# Patient Record
Sex: FEMALE | Race: WHITE | NOT HISPANIC OR LATINO | Employment: OTHER | URBAN - METROPOLITAN AREA
[De-identification: names, ages, dates, MRNs, and addresses within clinical notes are randomized per-mention and may not be internally consistent; named-entity substitution may affect disease eponyms.]

---

## 2018-09-10 ENCOUNTER — HOSPITAL ENCOUNTER (OUTPATIENT)
Dept: OTHER | Facility: HOSPITAL | Age: 73
Setting detail: SPECIMEN
Discharge: HOME | End: 2018-09-10
Attending: FAMILY MEDICINE | Admitting: FAMILY MEDICINE

## 2018-09-10 LAB
ALBUMIN SERPL-MCNC: 4.5 G/DL (ref 3.5–4.8)
ALBUMIN/GLOB SERPL: 1.5 {RATIO} (ref 1–1.7)
ALP SERPL-CCNC: 61 IU/L (ref 32–91)
ALT SERPL-CCNC: 18 IU/L (ref 14–54)
ANION GAP SERPL CALC-SCNC: 13.1 MMOL/L (ref 10–20)
AST SERPL-CCNC: 28 IU/L (ref 15–41)
BASOPHILS # BLD AUTO: 0.1 10*3/UL (ref 0–0.2)
BASOPHILS NFR BLD AUTO: 1 % (ref 0–2)
BILIRUB SERPL-MCNC: 0.8 MG/DL (ref 0.3–1.2)
BUN SERPL-MCNC: 14 MG/DL (ref 8–20)
BUN/CREAT SERPL: 20 (ref 5.4–26.2)
CALCIUM SERPL-MCNC: 9.8 MG/DL (ref 8.9–10.3)
CHLORIDE SERPL-SCNC: 96 MMOL/L (ref 101–111)
CHOLEST SERPL-MCNC: 193 MG/DL
CHOLEST/HDLC SERPL: 2.6 {RATIO}
CONV CO2: 28 MMOL/L (ref 22–32)
CONV LDL CHOLESTEROL DIRECT: 102 MG/DL (ref 0–100)
CONV TOTAL PROTEIN: 7.5 G/DL (ref 6.1–7.9)
CREAT UR-MCNC: 0.7 MG/DL (ref 0.4–1)
DIFFERENTIAL METHOD BLD: (no result)
EOSINOPHIL # BLD AUTO: 0.4 10*3/UL (ref 0–0.3)
EOSINOPHIL # BLD AUTO: 8 % (ref 0–3)
ERYTHROCYTE [DISTWIDTH] IN BLOOD BY AUTOMATED COUNT: 14 % (ref 11.5–14.5)
GLOBULIN UR ELPH-MCNC: 3 G/DL (ref 2.5–3.8)
GLUCOSE SERPL-MCNC: 111 MG/DL (ref 65–99)
HCT VFR BLD AUTO: 39.5 % (ref 35–49)
HDLC SERPL-MCNC: 75 MG/DL
HGB BLD-MCNC: 13.4 G/DL (ref 12–15)
LDLC/HDLC SERPL: 1.3 {RATIO}
LIPID INTERPRETATION: ABNORMAL
LYMPHOCYTES # BLD AUTO: 1.8 10*3/UL (ref 0.8–4.8)
LYMPHOCYTES NFR BLD AUTO: 35 % (ref 18–42)
MCH RBC QN AUTO: 29.3 PG (ref 26–32)
MCHC RBC AUTO-ENTMCNC: 33.9 G/DL (ref 32–36)
MCV RBC AUTO: 86.4 FL (ref 80–94)
MONOCYTES # BLD AUTO: 0.4 10*3/UL (ref 0.1–1.3)
MONOCYTES NFR BLD AUTO: 8 % (ref 2–11)
NEUTROPHILS # BLD AUTO: 2.5 10*3/UL (ref 2.3–8.6)
NEUTROPHILS NFR BLD AUTO: 48 % (ref 50–75)
NRBC BLD AUTO-RTO: 0 /100{WBCS}
NRBC/RBC NFR BLD MANUAL: 0 10*3/UL
PLATELET # BLD AUTO: 262 10*3/UL (ref 150–450)
PMV BLD AUTO: 8.7 FL (ref 7.4–10.4)
POTASSIUM SERPL-SCNC: 4.1 MMOL/L (ref 3.6–5.1)
RBC # BLD AUTO: 4.58 10*6/UL (ref 4–5.4)
SODIUM SERPL-SCNC: 133 MMOL/L (ref 136–144)
TRIGL SERPL-MCNC: 62 MG/DL
VLDLC SERPL CALC-MCNC: 16.1 MG/DL
WBC # BLD AUTO: 5.2 10*3/UL (ref 4.5–11.5)

## 2019-07-09 RX ORDER — LEVOTHYROXINE SODIUM 88 UG/1
1 TABLET ORAL EVERY 24 HOURS
COMMUNITY
Start: 2018-03-20 | End: 2019-07-09 | Stop reason: SDUPTHER

## 2019-07-09 RX ORDER — LEVOTHYROXINE SODIUM 88 UG/1
88 TABLET ORAL EVERY 24 HOURS
Qty: 90 TABLET | Refills: 1 | Status: SHIPPED | OUTPATIENT
Start: 2019-07-09 | End: 2019-07-10 | Stop reason: SDUPTHER

## 2019-07-10 RX ORDER — LEVOTHYROXINE SODIUM 88 UG/1
88 TABLET ORAL EVERY 24 HOURS
Qty: 90 TABLET | Refills: 1 | Status: SHIPPED | OUTPATIENT
Start: 2019-07-10 | End: 2020-01-21 | Stop reason: SDUPTHER

## 2019-07-22 ENCOUNTER — OFFICE VISIT (OUTPATIENT)
Dept: FAMILY MEDICINE CLINIC | Facility: CLINIC | Age: 74
End: 2019-07-22

## 2019-07-22 VITALS
DIASTOLIC BLOOD PRESSURE: 76 MMHG | HEIGHT: 66 IN | WEIGHT: 184.6 LBS | SYSTOLIC BLOOD PRESSURE: 138 MMHG | OXYGEN SATURATION: 97 % | HEART RATE: 65 BPM | BODY MASS INDEX: 29.67 KG/M2

## 2019-07-22 DIAGNOSIS — Z00.00 ENCOUNTER FOR PREVENTATIVE ADULT HEALTH CARE EXAMINATION: Primary | ICD-10-CM

## 2019-07-22 DIAGNOSIS — E78.2 MIXED HYPERLIPIDEMIA: ICD-10-CM

## 2019-07-22 DIAGNOSIS — Z86.010 HX OF COLONIC POLYPS: ICD-10-CM

## 2019-07-22 DIAGNOSIS — Z12.39 BREAST CANCER SCREENING: ICD-10-CM

## 2019-07-22 DIAGNOSIS — E03.9 ACQUIRED HYPOTHYROIDISM: ICD-10-CM

## 2019-07-22 DIAGNOSIS — R73.03 BORDERLINE DIABETES MELLITUS: ICD-10-CM

## 2019-07-22 DIAGNOSIS — J45.40 MODERATE PERSISTENT ASTHMA WITHOUT COMPLICATION: ICD-10-CM

## 2019-07-22 DIAGNOSIS — R03.0 BORDERLINE HYPERTENSION: ICD-10-CM

## 2019-07-22 LAB
ALBUMIN SERPL-MCNC: 4.4 G/DL (ref 3.5–4.8)
ALBUMIN/GLOB SERPL: 1.7 G/DL (ref 1–1.7)
ALP SERPL-CCNC: 63 U/L (ref 32–91)
ALT SERPL W P-5'-P-CCNC: 18 U/L (ref 14–54)
ANION GAP SERPL CALCULATED.3IONS-SCNC: 13.9 MMOL/L (ref 5–15)
ARTICHOKE IGE QN: 94 MG/DL (ref 0–100)
AST SERPL-CCNC: 28 U/L (ref 15–41)
BASOPHILS # BLD AUTO: 0 10*3/MM3 (ref 0–0.2)
BASOPHILS NFR BLD AUTO: 0.7 % (ref 0–1.5)
BILIRUB SERPL-MCNC: 1.2 MG/DL (ref 0.3–1.2)
BUN BLD-MCNC: 11 MG/DL (ref 8–20)
BUN/CREAT SERPL: 15.7 (ref 5.4–26.2)
CALCIUM SPEC-SCNC: 9.4 MG/DL (ref 8.9–10.3)
CHLORIDE SERPL-SCNC: 99 MMOL/L (ref 101–111)
CHOLEST SERPL-MCNC: 173 MG/DL
CO2 SERPL-SCNC: 25 MMOL/L (ref 22–32)
CREAT BLD-MCNC: 0.7 MG/DL (ref 0.4–1)
DEPRECATED RDW RBC AUTO: 42.4 FL (ref 37–54)
EOSINOPHIL # BLD AUTO: 0.4 10*3/MM3 (ref 0–0.4)
EOSINOPHIL NFR BLD AUTO: 8.7 % (ref 0.3–6.2)
ERYTHROCYTE [DISTWIDTH] IN BLOOD BY AUTOMATED COUNT: 13.7 % (ref 12.3–15.4)
GFR SERPL CREATININE-BSD FRML MDRD: 82 ML/MIN/1.73
GLOBULIN UR ELPH-MCNC: 2.6 GM/DL (ref 2.5–3.8)
GLUCOSE BLD-MCNC: 122 MG/DL (ref 65–99)
HCT VFR BLD AUTO: 39.6 % (ref 34–46.6)
HDLC SERPL QL: 2.66
HDLC SERPL-MCNC: 65 MG/DL
HGB BLD-MCNC: 13.3 G/DL (ref 12–15.9)
LDLC/HDLC SERPL: 1.39 {RATIO}
LYMPHOCYTES # BLD AUTO: 1.5 10*3/MM3 (ref 0.7–3.1)
LYMPHOCYTES NFR BLD AUTO: 29.3 % (ref 19.6–45.3)
MCH RBC QN AUTO: 29.9 PG (ref 26.6–33)
MCHC RBC AUTO-ENTMCNC: 33.6 G/DL (ref 31.5–35.7)
MCV RBC AUTO: 89.1 FL (ref 79–97)
MONOCYTES # BLD AUTO: 0.4 10*3/MM3 (ref 0.1–0.9)
MONOCYTES NFR BLD AUTO: 7.8 % (ref 5–12)
NEUTROPHILS # BLD AUTO: 2.7 10*3/MM3 (ref 1.7–7)
NEUTROPHILS NFR BLD AUTO: 53.5 % (ref 42.7–76)
NRBC BLD AUTO-RTO: 0.1 /100 WBC (ref 0–0.2)
PLATELET # BLD AUTO: 269 10*3/MM3 (ref 140–450)
PMV BLD AUTO: 8.9 FL (ref 6–12)
POTASSIUM BLD-SCNC: 3.9 MMOL/L (ref 3.6–5.1)
PROT SERPL-MCNC: 7 G/DL (ref 6.1–7.9)
RBC # BLD AUTO: 4.45 10*6/MM3 (ref 3.77–5.28)
SODIUM BLD-SCNC: 134 MMOL/L (ref 136–144)
T4 FREE SERPL-MCNC: 1.21 NG/DL (ref 0.58–1.64)
TRIGL SERPL-MCNC: 87 MG/DL
VLDLC SERPL-MCNC: 17.4 MG/DL
WBC NRBC COR # BLD: 5.1 10*3/MM3 (ref 3.4–10.8)

## 2019-07-22 PROCEDURE — 36415 COLL VENOUS BLD VENIPUNCTURE: CPT | Performed by: FAMILY MEDICINE

## 2019-07-22 PROCEDURE — 83036 HEMOGLOBIN GLYCOSYLATED A1C: CPT | Performed by: FAMILY MEDICINE

## 2019-07-22 PROCEDURE — 80061 LIPID PANEL: CPT | Performed by: FAMILY MEDICINE

## 2019-07-22 PROCEDURE — 80053 COMPREHEN METABOLIC PANEL: CPT | Performed by: FAMILY MEDICINE

## 2019-07-22 PROCEDURE — 99214 OFFICE O/P EST MOD 30 MIN: CPT | Performed by: FAMILY MEDICINE

## 2019-07-22 PROCEDURE — 84439 ASSAY OF FREE THYROXINE: CPT | Performed by: FAMILY MEDICINE

## 2019-07-22 PROCEDURE — 85025 COMPLETE CBC W/AUTO DIFF WBC: CPT | Performed by: FAMILY MEDICINE

## 2019-07-22 RX ORDER — FOSINOPRIL SODIUM 20 MG/1
TABLET ORAL
COMMUNITY
Start: 2019-06-17 | End: 2019-12-09 | Stop reason: SDUPTHER

## 2019-07-22 RX ORDER — ALBUTEROL SULFATE 90 UG/1
2 AEROSOL, METERED RESPIRATORY (INHALATION) EVERY 4 HOURS PRN
COMMUNITY
End: 2020-04-27

## 2019-07-22 RX ORDER — AMLODIPINE BESYLATE 5 MG/1
TABLET ORAL
COMMUNITY
Start: 2019-07-06 | End: 2019-12-23 | Stop reason: SDUPTHER

## 2019-07-22 RX ORDER — HYDROCHLOROTHIAZIDE 25 MG/1
TABLET ORAL
COMMUNITY
Start: 2019-07-06 | End: 2019-12-23 | Stop reason: SDUPTHER

## 2019-07-22 RX ORDER — COVID-19 ANTIGEN TEST
1 KIT MISCELLANEOUS 4 TIMES DAILY
COMMUNITY
End: 2022-05-20

## 2019-07-22 RX ORDER — LANOLIN ALCOHOL/MO/W.PET/CERES
1000 CREAM (GRAM) TOPICAL DAILY
COMMUNITY
End: 2021-01-25

## 2019-07-22 RX ORDER — MELATONIN
1000 DAILY
COMMUNITY

## 2019-07-22 RX ORDER — ATORVASTATIN CALCIUM 10 MG/1
TABLET, FILM COATED ORAL
COMMUNITY
Start: 2019-07-06 | End: 2019-12-23 | Stop reason: SDUPTHER

## 2019-07-22 RX ORDER — ANTIOX #8/OM3/DHA/EPA/LUT/ZEAX 250-2.5 MG
1 CAPSULE ORAL 2 TIMES DAILY
COMMUNITY

## 2019-07-22 RX ORDER — LEVOTHYROXINE SODIUM 88 UG/1
TABLET ORAL
COMMUNITY
Start: 2019-07-09 | End: 2020-01-21

## 2019-07-22 NOTE — PROGRESS NOTES
Subjective   Nafisa Washington is a 73 y.o. female.     Pt in for Annual Exam and Labs as well as F/U borderline HBP/HLD and Asthma.  Doing well w no CV or Neuro Sx's.  Asthma fairly stable. Due Colonoscopy (Hx of Polyps) and Mammography. DEXA next year.         The following portions of the patient's history were reviewed and updated as appropriate: allergies, current medications, past family history, past medical history, past social history, past surgical history and problem list.    Review of Systems   Constitutional: Negative.    HENT: Negative.    Eyes: Negative.    Respiratory: Positive for cough and wheezing.         Stable Asthma.   Cardiovascular: Negative for chest pain and palpitations.         HBP.   Gastrointestinal: Negative.    Endocrine:        Borderline DM.   Genitourinary: Negative.    Musculoskeletal: Negative.    Allergic/Immunologic: Negative.    Neurological: Negative.    Hematological: Negative.    Psychiatric/Behavioral: Negative.        Objective   Physical Exam   Constitutional: She is oriented to person, place, and time. She appears well-developed and well-nourished. No distress.   HENT:   Head: Normocephalic and atraumatic.   Nose: Nose normal.   Mouth/Throat: Oropharynx is clear and moist.   Eyes: Conjunctivae and EOM are normal. Pupils are equal, round, and reactive to light.   Neck: Normal range of motion. Neck supple. No thyromegaly present.   Cardiovascular: Normal rate, regular rhythm, normal heart sounds and intact distal pulses. Exam reveals no gallop.   No murmur heard.  Pulmonary/Chest: Effort normal and breath sounds normal. No respiratory distress. She has no wheezes. She exhibits no tenderness.   Abdominal: Soft. Bowel sounds are normal. There is no tenderness. There is no rebound.   No Bruits.   Musculoskeletal: Normal range of motion. She exhibits no edema, tenderness or deformity.   Lymphadenopathy:     She has no cervical adenopathy.   Neurological: She is alert and  oriented to person, place, and time. No cranial nerve deficit or sensory deficit. She exhibits normal muscle tone.   Skin: Skin is warm and dry. Capillary refill takes 2 to 3 seconds. No rash noted.   Psychiatric: She has a normal mood and affect. Her behavior is normal. Judgment and thought content normal.       Assessment/Plan   Nafisa was seen today for annual exam.    Diagnoses and all orders for this visit:    Encounter for preventative adult health care examination  -     CBC & Differential  -     Comprehensive Metabolic Panel  -     Hemoglobin A1c  -     Lipid Panel  -     T4, Free  -     Mammo Screening Digital Tomosynthesis Bilateral With CAD; Future  -     Ambulatory Referral For Screening Colonoscopy  -     CBC Auto Differential    Moderate persistent asthma without complication    Acquired hypothyroidism  -     T4, Free    Borderline diabetes mellitus  -     Hemoglobin A1c    Borderline hypertension  -     CBC & Differential  -     Comprehensive Metabolic Panel  -     Lipid Panel  -     CBC Auto Differential    Hx of colonic polyps  -     Ambulatory Referral For Screening Colonoscopy    Breast cancer screening  -     Mammo Screening Digital Tomosynthesis Bilateral With CAD; Future    Mixed hyperlipidemia   -     Lipid Panel

## 2019-07-23 LAB — HBA1C MFR BLD: 5.5 % (ref 3.5–5.6)

## 2019-07-23 NOTE — PATIENT INSTRUCTIONS
Ck labs as noted.  F/U as indicated.  Meds reviewed w Adj prn. Mammogram and Colonoscopy Orders.  DEXA next year.  Cont Healthy Heart Diet and Exercise as tolerated.  F/U prn or in 6 mo.

## 2019-09-13 ENCOUNTER — TELEPHONE (OUTPATIENT)
Dept: FAMILY MEDICINE CLINIC | Facility: CLINIC | Age: 74
End: 2019-09-13

## 2019-09-13 NOTE — TELEPHONE ENCOUNTER
Patient says she has rheumatoid arthritis in her hands and her fingers have been bothering her a lot lately.

## 2019-09-14 DIAGNOSIS — M06.9 RHEUMATOID ARTHRITIS, INVOLVING UNSPECIFIED SITE, UNSPECIFIED RHEUMATOID FACTOR PRESENCE: Primary | ICD-10-CM

## 2019-12-09 RX ORDER — FOSINOPRIL SODIUM 20 MG/1
20 TABLET ORAL DAILY
Qty: 30 TABLET | Refills: 2 | Status: SHIPPED | OUTPATIENT
Start: 2019-12-09 | End: 2020-01-21 | Stop reason: SDUPTHER

## 2019-12-23 RX ORDER — AMLODIPINE BESYLATE 5 MG/1
5 TABLET ORAL DAILY
Qty: 30 TABLET | Refills: 1 | Status: SHIPPED | OUTPATIENT
Start: 2019-12-23 | End: 2020-01-21 | Stop reason: SDUPTHER

## 2019-12-23 RX ORDER — HYDROCHLOROTHIAZIDE 25 MG/1
25 TABLET ORAL DAILY
Qty: 30 TABLET | Refills: 1 | Status: SHIPPED | OUTPATIENT
Start: 2019-12-23 | End: 2020-01-21 | Stop reason: SDUPTHER

## 2019-12-23 RX ORDER — ATORVASTATIN CALCIUM 10 MG/1
10 TABLET, FILM COATED ORAL DAILY
Qty: 30 TABLET | Refills: 1 | Status: SHIPPED | OUTPATIENT
Start: 2019-12-23 | End: 2020-01-21 | Stop reason: SDUPTHER

## 2020-01-21 ENCOUNTER — OFFICE VISIT (OUTPATIENT)
Dept: FAMILY MEDICINE CLINIC | Facility: CLINIC | Age: 75
End: 2020-01-21

## 2020-01-21 VITALS
WEIGHT: 182 LBS | OXYGEN SATURATION: 97 % | BODY MASS INDEX: 29.25 KG/M2 | SYSTOLIC BLOOD PRESSURE: 157 MMHG | DIASTOLIC BLOOD PRESSURE: 80 MMHG | HEIGHT: 66 IN | HEART RATE: 81 BPM

## 2020-01-21 DIAGNOSIS — E55.9 VITAMIN D DEFICIENCY: ICD-10-CM

## 2020-01-21 DIAGNOSIS — E78.2 MIXED HYPERLIPIDEMIA: ICD-10-CM

## 2020-01-21 DIAGNOSIS — R53.83 OTHER FATIGUE: ICD-10-CM

## 2020-01-21 DIAGNOSIS — E03.9 ACQUIRED HYPOTHYROIDISM: ICD-10-CM

## 2020-01-21 DIAGNOSIS — E53.8 B12 DEFICIENCY: ICD-10-CM

## 2020-01-21 DIAGNOSIS — M20.41 HAMMERTOE OF RIGHT FOOT: ICD-10-CM

## 2020-01-21 DIAGNOSIS — Z12.11 COLON CANCER SCREENING: ICD-10-CM

## 2020-01-21 DIAGNOSIS — I10 ESSENTIAL HYPERTENSION: ICD-10-CM

## 2020-01-21 DIAGNOSIS — M15.9 OSTEOARTHRITIS OF MULTIPLE JOINTS, UNSPECIFIED OSTEOARTHRITIS TYPE: Primary | ICD-10-CM
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
## 2020-01-21 PROCEDURE — 86038 ANTINUCLEAR ANTIBODIES: CPT | Performed by: NURSE PRACTITIONER

## 2020-01-21 PROCEDURE — 99214 OFFICE O/P EST MOD 30 MIN: CPT | Performed by: NURSE PRACTITIONER

## 2020-01-21 PROCEDURE — 36415 COLL VENOUS BLD VENIPUNCTURE: CPT | Performed by: NURSE PRACTITIONER

## 2020-01-21 PROCEDURE — 86431 RHEUMATOID FACTOR QUANT: CPT | Performed by: NURSE PRACTITIONER

## 2020-01-21 PROCEDURE — 82607 VITAMIN B-12: CPT | Performed by: NURSE PRACTITIONER

## 2020-01-21 PROCEDURE — 85027 COMPLETE CBC AUTOMATED: CPT | Performed by: NURSE PRACTITIONER

## 2020-01-21 PROCEDURE — 80061 LIPID PANEL: CPT | Performed by: NURSE PRACTITIONER

## 2020-01-21 PROCEDURE — 84443 ASSAY THYROID STIM HORMONE: CPT | Performed by: NURSE PRACTITIONER

## 2020-01-21 PROCEDURE — 80053 COMPREHEN METABOLIC PANEL: CPT | Performed by: NURSE PRACTITIONER

## 2020-01-21 PROCEDURE — 82306 VITAMIN D 25 HYDROXY: CPT | Performed by: NURSE PRACTITIONER

## 2020-01-21 PROCEDURE — 86618 LYME DISEASE ANTIBODY: CPT | Performed by: NURSE PRACTITIONER

## 2020-01-21 RX ORDER — LEVOTHYROXINE SODIUM 88 UG/1
88 TABLET ORAL EVERY 24 HOURS
Qty: 90 TABLET | Refills: 1 | Status: SHIPPED | OUTPATIENT
Start: 2020-01-21 | End: 2020-07-22 | Stop reason: SDUPTHER

## 2020-01-21 RX ORDER — FOSINOPRIL SODIUM 20 MG/1
20 TABLET ORAL 2 TIMES DAILY
Qty: 180 TABLET | Refills: 1 | Status: SHIPPED | OUTPATIENT
Start: 2020-01-21 | End: 2020-07-14

## 2020-01-21 RX ORDER — HYDROCHLOROTHIAZIDE 25 MG/1
25 TABLET ORAL DAILY
Qty: 90 TABLET | Refills: 1 | Status: SHIPPED | OUTPATIENT
Start: 2020-01-21 | End: 2020-07-22 | Stop reason: SDUPTHER

## 2020-01-21 RX ORDER — AMLODIPINE BESYLATE 5 MG/1
5 TABLET ORAL DAILY
Qty: 90 TABLET | Refills: 1 | Status: SHIPPED | OUTPATIENT
Start: 2020-01-21 | End: 2020-07-22 | Stop reason: SDUPTHER

## 2020-01-21 RX ORDER — ATORVASTATIN CALCIUM 10 MG/1
10 TABLET, FILM COATED ORAL DAILY
Qty: 90 TABLET | Refills: 1 | Status: SHIPPED | OUTPATIENT
Start: 2020-01-21 | End: 2020-07-22 | Stop reason: SDUPTHER

## 2020-01-21 NOTE — PROGRESS NOTES
"  Nafisa Washington is a 74 y.o. female.     Chief Complaint   Patient presents with   • Fatigue     tick bite this summer   • Follow-up     6 month    • Med Refill       History of Present Illness     Patient here today for 6-month follow-up, would like to discuss worsening of arthritis, with multiple nodules in the joints of her hands, a deformity of the right second toe that causes difficulty with ambulation.    She has worsening fatigue, she is an active person and reports after activity or exercise she has to take a nap, had a tick bite last year and is concerned this may be part of the reason.  Takes vitamin D and B12 daily, also started over-the-counter iron, no improvement    Would like to follow-up on hypertension, hyperlipidemia, hypothyroid, vitamin D deficiency and vitamin B12 deficiency.  Her blood pressure is elevated today but reports it is typically not outside of the office.  Denies chest pain, shortness of air, palpitations and swelling of the extremities.  All medications as directed    Subjective     Visit Vitals  /80 (BP Location: Left arm, Patient Position: Sitting, Cuff Size: Large Adult)   Pulse 81   Ht 167.6 cm (66\")   Wt 82.6 kg (182 lb)   SpO2 97%   BMI 29.38 kg/m²       The following portions of the patient's history were reviewed and updated as appropriate: allergies, current medications, past family history, past medical history, past social history, past surgical history and problem list.    Review of Systems   Constitutional: Positive for fatigue. Negative for chills and fever.   HENT: Negative for dental problem, ear pain, sinus pressure and sore throat.    Eyes: Negative for visual disturbance.   Respiratory: Negative for cough, shortness of breath and wheezing.    Cardiovascular: Negative for chest pain, palpitations and leg swelling.   Gastrointestinal: Negative for abdominal pain, blood in stool, constipation, diarrhea, nausea, vomiting and GERD.   Genitourinary: Negative " for difficulty urinating, frequency, urgency and urinary incontinence.   Musculoskeletal: Positive for arthralgias and gait problem. Negative for back pain, joint swelling, myalgias and neck pain.   Skin: Negative for dry skin, pallor and rash.   Neurological: Negative for dizziness, seizures, speech difficulty and weakness.   Hematological: Negative for adenopathy.   Psychiatric/Behavioral: Negative for sleep disturbance, depressed mood and stress. The patient is not nervous/anxious.        Objective     Physical Exam   Constitutional: She is oriented to person, place, and time. She appears well-developed and well-nourished. No distress.   HENT:   Head: Normocephalic.   Eyes: Pupils are equal, round, and reactive to light. Conjunctivae are normal.   Neck: Normal range of motion. Neck supple. No JVD present. No thyromegaly present.   Cardiovascular: Normal rate, regular rhythm and normal heart sounds.   No murmur heard.  Pulmonary/Chest: Effort normal and breath sounds normal.   Abdominal: Soft. Bowel sounds are normal. She exhibits no distension. There is no tenderness.   Musculoskeletal: Normal range of motion. She exhibits deformity (right 2nd toe, hammertoe. Multi nodules of joints of fingers/hands). She exhibits no edema or tenderness.   Neurological: She is alert and oriented to person, place, and time. No sensory deficit.   Skin: Skin is warm and dry. No rash noted. She is not diaphoretic. No erythema.   Psychiatric: She has a normal mood and affect. Her behavior is normal. Judgment and thought content normal.   Nursing note and vitals reviewed.        Assessment/Plan   Nafisa was seen today for fatigue, follow-up and med refill.    Diagnoses and all orders for this visit:    Osteoarthritis of multiple joints, unspecified osteoarthritis type  -     Ambulatory Referral to Rheumatology  -     JOSE  -     Rheumatoid Factor    Acquired hypothyroidism  -     TSH    Essential hypertension  -     CBC (No Diff)  -      Comprehensive Metabolic Panel    Mixed hyperlipidemia  -     Lipid Panel    Other fatigue    B12 deficiency  -     Vitamin B12    Vitamin D deficiency  -     Vitamin D 25 Hydroxy    Tick bite, initial encounter  -     Lyme Disease IgG/IgM Antibodies    Colon cancer screening  -     Ambulatory Referral For Screening Colonoscopy    Nash of right foot  -     Ambulatory Referral to Podiatry    Other orders  -     fosinopril (MONOPRIL) 20 MG tablet; Take 1 tablet by mouth 2 (Two) Times a Day.  -     levothyroxine (SYNTHROID, LEVOTHROID) 88 MCG tablet; Take 1 tablet by mouth Daily.  -     hydroCHLOROthiazide (HYDRODIURIL) 25 MG tablet; Take 1 tablet by mouth Daily.  -     atorvastatin (LIPITOR) 10 MG tablet; Take 1 tablet by mouth Daily.  -     amLODIPine (NORVASC) 5 MG tablet; Take 1 tablet by mouth Daily.      Check all labs as above, will notify patient results  Refilled all medications for 90-day supplies  Flu shot, shingles, pneumonia vaccines up-to-date  Mammogram will be due at next visit  mammo UTD 9/2019, order at next visit  Refer for c-scope  Refer to rheum, check RA factor  Refer to Dr. English for R nash  Return in 6 months or earlier as needed             Glucose   Date Value Ref Range Status   07/22/2019 122 (H) 65 - 99 mg/dL Final     BUN   Date Value Ref Range Status   07/22/2019 11 8 - 20 mg/dL Final     Creatinine   Date Value Ref Range Status   07/22/2019 0.70 0.40 - 1.00 mg/dL Final     Sodium   Date Value Ref Range Status   07/22/2019 134 (L) 136 - 144 mmol/L Final     Potassium   Date Value Ref Range Status   07/22/2019 3.9 3.6 - 5.1 mmol/L Final     Chloride   Date Value Ref Range Status   07/22/2019 99 (L) 101 - 111 mmol/L Final     CO2   Date Value Ref Range Status   07/22/2019 25.0 22.0 - 32.0 mmol/L Final     Calcium   Date Value Ref Range Status   07/22/2019 9.4 8.9 - 10.3 mg/dL Final     Total Protein   Date Value Ref Range Status   07/22/2019 7.0 6.1 - 7.9 g/dL Final     Albumin    Date Value Ref Range Status   07/22/2019 4.40 3.50 - 4.80 g/dL Final     ALT (SGPT)   Date Value Ref Range Status   07/22/2019 18 14 - 54 U/L Final     AST (SGOT)   Date Value Ref Range Status   07/22/2019 28 15 - 41 U/L Final     Alkaline Phosphatase   Date Value Ref Range Status   07/22/2019 63 32 - 91 U/L Final     Total Bilirubin   Date Value Ref Range Status   07/22/2019 1.2 0.3 - 1.2 mg/dL Final     eGFR Non  Amer   Date Value Ref Range Status   07/22/2019 82 >60 mL/min/1.73 Final     A/G Ratio   Date Value Ref Range Status   09/10/2018 1.5 1.0 - 1.7 Final     BUN/Creatinine Ratio   Date Value Ref Range Status   07/22/2019 15.7 5.4 - 26.2 Final     Anion Gap   Date Value Ref Range Status   07/22/2019 13.9 5.0 - 15.0 mmol/L Final

## 2020-01-22 LAB
25(OH)D3 SERPL-MCNC: 64.8 NG/ML (ref 30–100)
ALBUMIN SERPL-MCNC: 4.5 G/DL (ref 3.5–5.2)
ALBUMIN/GLOB SERPL: 1.4 G/DL
ALP SERPL-CCNC: 69 U/L (ref 39–117)
ALT SERPL W P-5'-P-CCNC: 16 U/L (ref 1–33)
ANA SER QL: NEGATIVE
ANION GAP SERPL CALCULATED.3IONS-SCNC: 15.2 MMOL/L (ref 5–15)
AST SERPL-CCNC: 29 U/L (ref 1–32)
B BURGDOR IGG SER QL: NEGATIVE
B BURGDOR IGM SER QL: NEGATIVE
BILIRUB SERPL-MCNC: 0.5 MG/DL (ref 0.2–1.2)
BUN BLD-MCNC: 19 MG/DL (ref 8–23)
BUN/CREAT SERPL: 32.8 (ref 7–25)
CALCIUM SPEC-SCNC: 9.6 MG/DL (ref 8.6–10.5)
CHLORIDE SERPL-SCNC: 93 MMOL/L (ref 98–107)
CHOLEST SERPL-MCNC: 179 MG/DL (ref 0–200)
CHROMATIN AB SERPL-ACNC: <10 IU/ML (ref 0–14)
CO2 SERPL-SCNC: 22.8 MMOL/L (ref 22–29)
CREAT BLD-MCNC: 0.58 MG/DL (ref 0.57–1)
DEPRECATED RDW RBC AUTO: 43.7 FL (ref 37–54)
ERYTHROCYTE [DISTWIDTH] IN BLOOD BY AUTOMATED COUNT: 13.2 % (ref 12.3–15.4)
GFR SERPL CREATININE-BSD FRML MDRD: 102 ML/MIN/1.73
GLOBULIN UR ELPH-MCNC: 3.2 GM/DL
GLUCOSE BLD-MCNC: 99 MG/DL (ref 65–99)
HCT VFR BLD AUTO: 42.2 % (ref 34–46.6)
HDLC SERPL-MCNC: 69 MG/DL (ref 40–60)
HGB BLD-MCNC: 13.9 G/DL (ref 12–15.9)
LDLC SERPL CALC-MCNC: 94 MG/DL (ref 0–100)
LDLC/HDLC SERPL: 1.37 {RATIO}
MCH RBC QN AUTO: 29.6 PG (ref 26.6–33)
MCHC RBC AUTO-ENTMCNC: 32.9 G/DL (ref 31.5–35.7)
MCV RBC AUTO: 89.8 FL (ref 79–97)
PLATELET # BLD AUTO: 278 10*3/MM3 (ref 140–450)
PMV BLD AUTO: 11.5 FL (ref 6–12)
POTASSIUM BLD-SCNC: 4 MMOL/L (ref 3.5–5.2)
PROT SERPL-MCNC: 7.7 G/DL (ref 6–8.5)
RBC # BLD AUTO: 4.7 10*6/MM3 (ref 3.77–5.28)
SODIUM BLD-SCNC: 131 MMOL/L (ref 136–145)
TRIGL SERPL-MCNC: 78 MG/DL (ref 0–150)
TSH SERPL DL<=0.05 MIU/L-ACNC: 2.95 UIU/ML (ref 0.27–4.2)
VIT B12 BLD-MCNC: 981 PG/ML (ref 211–946)
VLDLC SERPL-MCNC: 15.6 MG/DL (ref 5–40)
WBC NRBC COR # BLD: 6.26 10*3/MM3 (ref 3.4–10.8)

## 2020-04-27 RX ORDER — ALBUTEROL SULFATE 90 UG/1
AEROSOL, METERED RESPIRATORY (INHALATION)
Qty: 18 INHALER | Refills: 2 | Status: SHIPPED | OUTPATIENT
Start: 2020-04-27 | End: 2020-07-27

## 2020-06-19 ENCOUNTER — TELEPHONE (OUTPATIENT)
Dept: FAMILY MEDICINE CLINIC | Facility: CLINIC | Age: 75
End: 2020-06-19

## 2020-06-19 NOTE — TELEPHONE ENCOUNTER
Dasha left mess on ma line about she was referred to [facility] Rheumatology and they are wanting her to have blood work before putting her on a medication and it sounded like she spelled it out Sulfadiazine (?) not sure that may not be right.  I didn't  contact patient back.  Althea could you please contact her and see exactly what she needs? Thanks bm

## 2020-06-22 NOTE — TELEPHONE ENCOUNTER
She had a panel of labs in January including lupus and RA work up. She is due for HTN panel, but can you call to please find out what else they would like? thanks

## 2020-06-25 NOTE — TELEPHONE ENCOUNTER
Spoke with patient.  She said she believes she has already had the labs and they are no longer needed.  I told her to be sure to come in a week prior to next appt for htn panel.

## 2020-07-14 RX ORDER — FOSINOPRIL SODIUM 20 MG/1
TABLET ORAL
Qty: 180 TABLET | Refills: 1 | Status: SHIPPED | OUTPATIENT
Start: 2020-07-14 | End: 2020-10-26

## 2020-07-22 ENCOUNTER — OFFICE VISIT (OUTPATIENT)
Dept: FAMILY MEDICINE CLINIC | Facility: CLINIC | Age: 75
End: 2020-07-22

## 2020-07-22 VITALS
BODY MASS INDEX: 30.15 KG/M2 | OXYGEN SATURATION: 99 % | HEIGHT: 66 IN | SYSTOLIC BLOOD PRESSURE: 138 MMHG | HEART RATE: 56 BPM | WEIGHT: 187.6 LBS | TEMPERATURE: 97.9 F | DIASTOLIC BLOOD PRESSURE: 80 MMHG

## 2020-07-22 DIAGNOSIS — J45.40 MODERATE PERSISTENT ASTHMA WITHOUT COMPLICATION: ICD-10-CM

## 2020-07-22 DIAGNOSIS — E03.9 ACQUIRED HYPOTHYROIDISM: ICD-10-CM

## 2020-07-22 DIAGNOSIS — Z86.010 HX OF COLONIC POLYPS: ICD-10-CM

## 2020-07-22 DIAGNOSIS — I10 ESSENTIAL HYPERTENSION: Primary | ICD-10-CM

## 2020-07-22 DIAGNOSIS — M15.9 OSTEOARTHRITIS OF MULTIPLE JOINTS, UNSPECIFIED OSTEOARTHRITIS TYPE: ICD-10-CM

## 2020-07-22 DIAGNOSIS — E78.2 MIXED HYPERLIPIDEMIA: ICD-10-CM

## 2020-07-22 DIAGNOSIS — Z00.00 PREVENTATIVE HEALTH CARE: ICD-10-CM

## 2020-07-22 PROCEDURE — 84443 ASSAY THYROID STIM HORMONE: CPT | Performed by: NURSE PRACTITIONER

## 2020-07-22 PROCEDURE — 85027 COMPLETE CBC AUTOMATED: CPT | Performed by: NURSE PRACTITIONER

## 2020-07-22 PROCEDURE — 86803 HEPATITIS C AB TEST: CPT | Performed by: NURSE PRACTITIONER

## 2020-07-22 PROCEDURE — 80053 COMPREHEN METABOLIC PANEL: CPT | Performed by: NURSE PRACTITIONER

## 2020-07-22 PROCEDURE — 99214 OFFICE O/P EST MOD 30 MIN: CPT | Performed by: NURSE PRACTITIONER

## 2020-07-22 PROCEDURE — 36415 COLL VENOUS BLD VENIPUNCTURE: CPT | Performed by: NURSE PRACTITIONER

## 2020-07-22 PROCEDURE — 80061 LIPID PANEL: CPT | Performed by: NURSE PRACTITIONER

## 2020-07-22 RX ORDER — HYDROCHLOROTHIAZIDE 25 MG/1
25 TABLET ORAL DAILY
Qty: 90 TABLET | Refills: 1 | Status: SHIPPED | OUTPATIENT
Start: 2020-07-22 | End: 2021-01-25 | Stop reason: SDUPTHER

## 2020-07-22 RX ORDER — ATORVASTATIN CALCIUM 10 MG/1
10 TABLET, FILM COATED ORAL DAILY
Qty: 90 TABLET | Refills: 1 | Status: SHIPPED | OUTPATIENT
Start: 2020-07-22 | End: 2021-01-25 | Stop reason: SDUPTHER

## 2020-07-22 RX ORDER — SULFASALAZINE 500 MG/1
500 TABLET ORAL 2 TIMES DAILY
COMMUNITY
End: 2021-10-25

## 2020-07-22 RX ORDER — AMLODIPINE BESYLATE 5 MG/1
5 TABLET ORAL DAILY
Qty: 90 TABLET | Refills: 1 | Status: SHIPPED | OUTPATIENT
Start: 2020-07-22 | End: 2021-01-25 | Stop reason: SDUPTHER

## 2020-07-22 RX ORDER — LEVOTHYROXINE SODIUM 88 UG/1
88 TABLET ORAL EVERY 24 HOURS
Qty: 90 TABLET | Refills: 1 | Status: SHIPPED | OUTPATIENT
Start: 2020-07-22 | End: 2020-07-29

## 2020-07-22 NOTE — PROGRESS NOTES
Chief Complaint   Patient presents with   • Knee Pain     patient reports that they are better   • Follow-up       HPI     HTN, stable on meds and takes as directed, denies chest pain, headache, shortness of air, palpitations and swelling of extremities.     Hypothyroidism, stable on medication, denies symptoms of constipation, weight gain/loss, hot or cold intolerance, hair loss, abnormal heart rate, she does report improvement in fatigue.     Knee pain, following with Rheumatology, Dr. Coretta Cho.  Patient on sulfasalazine and naproxen and receiving injections both knees, reports symptoms have improved.  [facility]    Asthma, Symptoms include dyspnea, non-productive cough and wheezing intermittently and worse with heat and allergens, occasionally with exertion.   Symptoms have been stable since onset.  The pt is using inhalers as directed.       The following portions of the patient's history were reviewed and updated as appropriate: allergies, current medications, past family history, past medical history, past social history, past surgical history and problem list.    Past Medical History:   Diagnosis Date   • Asthma    • Disease of thyroid gland    • Hyperlipidemia    • Hypertension    • Macular degeneration      Past Surgical History:   Procedure Laterality Date   • ADENOIDECTOMY     • DENTAL PROCEDURE     • HYSTERECTOMY      Full    • TONSILLECTOMY       History reviewed. No pertinent family history.  Social History     Tobacco Use   • Smoking status: Never Smoker   Substance Use Topics   • Alcohol use: Never     Frequency: Never         Current Outpatient Medications:   •  amLODIPine (NORVASC) 5 MG tablet, Take 1 tablet by mouth Daily., Disp: 90 tablet, Rfl: 1  •  atorvastatin (LIPITOR) 10 MG tablet, Take 1 tablet by mouth Daily., Disp: 90 tablet, Rfl: 1  •  cholecalciferol (VITAMIN D3) 1000 units tablet, Take 1,000 Units by mouth Daily.,  "Disp: , Rfl:   •  Ferrous Sulfate Dried ER (SLOW IRON) 160 (50 Fe) MG tablet controlled-release, Take  by mouth., Disp: , Rfl:   •  fluticasone-salmeterol (Advair Diskus) 100-50 MCG/DOSE DISKUS, Inhale 1 puff 2 (Two) Times a Day., Disp: 60 each, Rfl: 5  •  fosinopril (MONOPRIL) 20 MG tablet, TAKE 1 TABLET BY MOUTH TWICE A DAY, Disp: 180 tablet, Rfl: 1  •  hydroCHLOROthiazide (HYDRODIURIL) 25 MG tablet, Take 1 tablet by mouth Daily., Disp: 90 tablet, Rfl: 1  •  levothyroxine (SYNTHROID, LEVOTHROID) 88 MCG tablet, Take 1 tablet by mouth Daily., Disp: 90 tablet, Rfl: 1  •  Multiple Vitamin (MULTI-VITAMIN DAILY PO), Take  by mouth., Disp: , Rfl:   •  Multiple Vitamins-Minerals (EYE VITAMINS PO), Take  by mouth., Disp: , Rfl:   •  multivitamins-minerals (PRESERVISION AREDS 2) capsule capsule, Take 1 capsule by mouth 2 (Two) Times a Day., Disp: , Rfl:   •  Naproxen Sodium (ALEVE) 220 MG capsule, Take 1 tablet by mouth 4 (Four) Times a Day., Disp: , Rfl:   •  sulfaSALAzine (AZULFIDINE) 500 MG tablet, Take 500 mg by mouth 2 (Two) Times a Day., Disp: , Rfl:   •  VENTOLIN  (90 Base) MCG/ACT inhaler, INHLE 2 PUFFS 4 TIMES A DAY AS NEEDED, Disp: 18 inhaler, Rfl: 2  •  vitamin B-12 (CYANOCOBALAMIN) 1000 MCG tablet, Take 1,000 mcg by mouth Daily., Disp: , Rfl:       Review of Systems       A full 12 point review of systems has been obtained as mentioned in HPI, otherwise negative      Vitals:    07/22/20 0844   BP: 138/80   BP Location: Left arm   Patient Position: Sitting   Cuff Size: Adult   Pulse: 56   Temp: 97.9 °F (36.6 °C)   TempSrc: Skin   SpO2: 99%   Weight: 85.1 kg (187 lb 9.6 oz)   Height: 167.6 cm (65.98\")     Body mass index is 30.29 kg/m².    Physical Exam   Constitutional: She is oriented to person, place, and time. She appears well-developed and well-nourished. No distress.   Eyes: Pupils are equal, round, and reactive to light.   Neck: Normal range of motion. Neck supple. No JVD present. No thyromegaly " present.   Cardiovascular: Normal rate, regular rhythm, normal heart sounds and intact distal pulses.   No murmur heard.  Pulmonary/Chest: Effort normal and breath sounds normal. No respiratory distress.   Abdominal: Soft. Bowel sounds are normal. She exhibits no distension. There is no tenderness.   Musculoskeletal: Normal range of motion. She exhibits no tenderness.   Multi joint OA, improved rom   Neurological: She is alert and oriented to person, place, and time. No sensory deficit.   Skin: Skin is warm and dry. She is not diaphoretic.   Psychiatric: She has a normal mood and affect. Her behavior is normal. Judgment and thought content normal.   Nursing note and vitals reviewed.      No visits with results within 7 Day(s) from this visit.   Latest known visit with results is:   Office Visit on 01/21/2020   Component Date Value Ref Range Status   • WBC 01/21/2020 6.26  3.40 - 10.80 10*3/mm3 Final   • RBC 01/21/2020 4.70  3.77 - 5.28 10*6/mm3 Final   • Hemoglobin 01/21/2020 13.9  12.0 - 15.9 g/dL Final   • Hematocrit 01/21/2020 42.2  34.0 - 46.6 % Final   • MCV 01/21/2020 89.8  79.0 - 97.0 fL Final   • MCH 01/21/2020 29.6  26.6 - 33.0 pg Final   • MCHC 01/21/2020 32.9  31.5 - 35.7 g/dL Final   • RDW 01/21/2020 13.2  12.3 - 15.4 % Final   • RDW-SD 01/21/2020 43.7  37.0 - 54.0 fl Final   • MPV 01/21/2020 11.5  6.0 - 12.0 fL Final   • Platelets 01/21/2020 278  140 - 450 10*3/mm3 Final   • Total Cholesterol 01/21/2020 179  0 - 200 mg/dL Final   • Triglycerides 01/21/2020 78  0 - 150 mg/dL Final   • HDL Cholesterol 01/21/2020 69* 40 - 60 mg/dL Final   • LDL Cholesterol  01/21/2020 94  0 - 100 mg/dL Final   • VLDL Cholesterol 01/21/2020 15.6  5 - 40 mg/dL Final   • LDL/HDL Ratio 01/21/2020 1.37   Final   • TSH 01/21/2020 2.950  0.270 - 4.200 uIU/mL Final   • Glucose 01/21/2020 99  65 - 99 mg/dL Final   • BUN 01/21/2020 19  8 - 23 mg/dL Final   • Creatinine 01/21/2020 0.58  0.57 - 1.00 mg/dL Final   • Sodium 01/21/2020  131* 136 - 145 mmol/L Final   • Potassium 01/21/2020 4.0  3.5 - 5.2 mmol/L Final   • Chloride 01/21/2020 93* 98 - 107 mmol/L Final   • CO2 01/21/2020 22.8  22.0 - 29.0 mmol/L Final   • Calcium 01/21/2020 9.6  8.6 - 10.5 mg/dL Final   • Total Protein 01/21/2020 7.7  6.0 - 8.5 g/dL Final   • Albumin 01/21/2020 4.50  3.50 - 5.20 g/dL Final   • ALT (SGPT) 01/21/2020 16  1 - 33 U/L Final   • AST (SGOT) 01/21/2020 29  1 - 32 U/L Final   • Alkaline Phosphatase 01/21/2020 69  39 - 117 U/L Final   • Total Bilirubin 01/21/2020 0.5  0.2 - 1.2 mg/dL Final   • eGFR Non African Amer 01/21/2020 102  >60 mL/min/1.73 Final   • Globulin 01/21/2020 3.2  gm/dL Final   • A/G Ratio 01/21/2020 1.4  g/dL Final   • BUN/Creatinine Ratio 01/21/2020 32.8* 7.0 - 25.0 Final   • Anion Gap 01/21/2020 15.2* 5.0 - 15.0 mmol/L Final   • Vitamin B-12 01/21/2020 981* 211 - 946 pg/mL Final   • 25 Hydroxy, Vitamin D 01/21/2020 64.8  30.0 - 100.0 ng/ml Final   • Antinuclear Antibodies (JOSE) 01/21/2020 Negative  Negative Final   • Rheumatoid Factor Quantitative 01/21/2020 <10.0  0.0 - 14.0 IU/mL Final   • Lyme Ab IgG 01/21/2020 Negative  Negative Final   • Lyme Ab IgM 01/21/2020 Negative  Negative Final       Diagnoses and all orders for this visit:    1. Essential hypertension (Primary)  -     amLODIPine (NORVASC) 5 MG tablet; Take 1 tablet by mouth Daily.  Dispense: 90 tablet; Refill: 1  -     hydroCHLOROthiazide (HYDRODIURIL) 25 MG tablet; Take 1 tablet by mouth Daily.  Dispense: 90 tablet; Refill: 1  -     Lipid Panel  -     Comprehensive Metabolic Panel  -     CBC (No Diff)    2. Acquired hypothyroidism  -     levothyroxine (SYNTHROID, LEVOTHROID) 88 MCG tablet; Take 1 tablet by mouth Daily.  Dispense: 90 tablet; Refill: 1  -     TSH    3. Moderate persistent asthma without complication  -     fluticasone-salmeterol (Advair Diskus) 100-50 MCG/DOSE DISKUS; Inhale 1 puff 2 (Two) Times a Day.  Dispense: 60 each; Refill: 5    4. Preventative health  care  -     Hepatitis C Antibody    5. Hx of colonic polyps    6. Osteoarthritis of multiple joints, unspecified osteoarthritis type    7. Mixed hyperlipidemia  -     atorvastatin (LIPITOR) 10 MG tablet; Take 1 tablet by mouth Daily.  Dispense: 90 tablet; Refill: 1        Conditions stable, rf meds as above  Get report of mammo completed in January at priority  Colonoscopy needs scheduled, pt to call for apt with [facility], repeat Q5 years  Keep f/u with rheumatology, stable now on sulfasal and naprox   Schedule appt with podiatry when able  update labs and hep c ab today, will call results  Return to office in 6mo with fasting labs prior, or earlier as needed

## 2020-07-23 LAB
ALBUMIN SERPL-MCNC: 4.5 G/DL (ref 3.5–5.2)
ALBUMIN/GLOB SERPL: 1.5 G/DL
ALP SERPL-CCNC: 66 U/L (ref 39–117)
ALT SERPL W P-5'-P-CCNC: 17 U/L (ref 1–33)
ANION GAP SERPL CALCULATED.3IONS-SCNC: 13.8 MMOL/L (ref 5–15)
AST SERPL-CCNC: 24 U/L (ref 1–32)
BILIRUB SERPL-MCNC: 0.4 MG/DL (ref 0–1.2)
BUN SERPL-MCNC: 13 MG/DL (ref 8–23)
BUN/CREAT SERPL: 20.6 (ref 7–25)
CALCIUM SPEC-SCNC: 9.9 MG/DL (ref 8.6–10.5)
CHLORIDE SERPL-SCNC: 91 MMOL/L (ref 98–107)
CHOLEST SERPL-MCNC: 210 MG/DL (ref 0–200)
CO2 SERPL-SCNC: 25.2 MMOL/L (ref 22–29)
CREAT SERPL-MCNC: 0.63 MG/DL (ref 0.57–1)
DEPRECATED RDW RBC AUTO: 48.3 FL (ref 37–54)
ERYTHROCYTE [DISTWIDTH] IN BLOOD BY AUTOMATED COUNT: 14.2 % (ref 12.3–15.4)
GFR SERPL CREATININE-BSD FRML MDRD: 93 ML/MIN/1.73
GLOBULIN UR ELPH-MCNC: 3 GM/DL
GLUCOSE SERPL-MCNC: 89 MG/DL (ref 65–99)
HCT VFR BLD AUTO: 42.5 % (ref 34–46.6)
HCV AB SER DONR QL: NORMAL
HDLC SERPL-MCNC: 72 MG/DL (ref 40–60)
HGB BLD-MCNC: 13.7 G/DL (ref 12–15.9)
LDLC SERPL CALC-MCNC: 123 MG/DL (ref 0–100)
LDLC/HDLC SERPL: 1.71 {RATIO}
MCH RBC QN AUTO: 29.5 PG (ref 26.6–33)
MCHC RBC AUTO-ENTMCNC: 32.2 G/DL (ref 31.5–35.7)
MCV RBC AUTO: 91.4 FL (ref 79–97)
PLATELET # BLD AUTO: 241 10*3/MM3 (ref 140–450)
PMV BLD AUTO: 10.4 FL (ref 6–12)
POTASSIUM SERPL-SCNC: 4 MMOL/L (ref 3.5–5.2)
PROT SERPL-MCNC: 7.5 G/DL (ref 6–8.5)
RBC # BLD AUTO: 4.65 10*6/MM3 (ref 3.77–5.28)
SODIUM SERPL-SCNC: 130 MMOL/L (ref 136–145)
TRIGL SERPL-MCNC: 75 MG/DL (ref 0–150)
TSH SERPL DL<=0.05 MIU/L-ACNC: 13.2 UIU/ML (ref 0.27–4.2)
VLDLC SERPL-MCNC: 15 MG/DL (ref 5–40)
WBC # BLD AUTO: 4.99 10*3/MM3 (ref 3.4–10.8)

## 2020-07-27 RX ORDER — ALBUTEROL SULFATE 90 UG/1
AEROSOL, METERED RESPIRATORY (INHALATION)
Qty: 18 INHALER | Refills: 2 | Status: SHIPPED | OUTPATIENT
Start: 2020-07-27 | End: 2021-04-05

## 2020-07-29 DIAGNOSIS — E03.9 ACQUIRED HYPOTHYROIDISM: ICD-10-CM

## 2020-07-29 RX ORDER — LEVOTHYROXINE SODIUM 112 UG/1
112 TABLET ORAL EVERY 24 HOURS
Qty: 90 TABLET | Refills: 0 | Status: SHIPPED | OUTPATIENT
Start: 2020-07-29 | End: 2020-08-26

## 2020-08-26 DIAGNOSIS — E03.9 ACQUIRED HYPOTHYROIDISM: ICD-10-CM

## 2020-08-26 RX ORDER — LEVOTHYROXINE SODIUM 112 UG/1
TABLET ORAL
Qty: 90 TABLET | Refills: 0 | Status: SHIPPED | OUTPATIENT
Start: 2020-08-26 | End: 2020-12-03 | Stop reason: SDUPTHER

## 2020-09-10 ENCOUNTER — CLINICAL SUPPORT (OUTPATIENT)
Dept: FAMILY MEDICINE CLINIC | Facility: CLINIC | Age: 75
End: 2020-09-10

## 2020-09-10 DIAGNOSIS — E03.9 ACQUIRED HYPOTHYROIDISM: Primary | ICD-10-CM

## 2020-09-10 DIAGNOSIS — Z23 NEED FOR INFLUENZA VACCINATION: ICD-10-CM

## 2020-09-10 PROCEDURE — 90694 VACC AIIV4 NO PRSRV 0.5ML IM: CPT | Performed by: NURSE PRACTITIONER

## 2020-09-10 PROCEDURE — 36415 COLL VENOUS BLD VENIPUNCTURE: CPT | Performed by: NURSE PRACTITIONER

## 2020-09-10 PROCEDURE — 84443 ASSAY THYROID STIM HORMONE: CPT | Performed by: NURSE PRACTITIONER

## 2020-09-10 PROCEDURE — G0008 ADMIN INFLUENZA VIRUS VAC: HCPCS | Performed by: NURSE PRACTITIONER

## 2020-09-11 LAB — TSH SERPL DL<=0.05 MIU/L-ACNC: 4.51 UIU/ML (ref 0.27–4.2)

## 2020-09-21 ENCOUNTER — TELEPHONE (OUTPATIENT)
Dept: FAMILY MEDICINE CLINIC | Facility: CLINIC | Age: 75
End: 2020-09-21

## 2020-09-22 DIAGNOSIS — Z12.31 BREAST CANCER SCREENING BY MAMMOGRAM: Primary | ICD-10-CM

## 2020-09-28 ENCOUNTER — TELEPHONE (OUTPATIENT)
Dept: FAMILY MEDICINE CLINIC | Facility: CLINIC | Age: 75
End: 2020-09-28

## 2020-10-26 RX ORDER — FOSINOPRIL SODIUM 20 MG/1
TABLET ORAL
Qty: 180 TABLET | Refills: 1 | Status: SHIPPED | OUTPATIENT
Start: 2020-10-26 | End: 2021-01-25 | Stop reason: SDUPTHER

## 2020-12-03 ENCOUNTER — TELEPHONE (OUTPATIENT)
Dept: FAMILY MEDICINE CLINIC | Facility: CLINIC | Age: 75
End: 2020-12-03

## 2020-12-03 DIAGNOSIS — E03.9 ACQUIRED HYPOTHYROIDISM: ICD-10-CM

## 2020-12-03 RX ORDER — LEVOTHYROXINE SODIUM 112 UG/1
112 TABLET ORAL DAILY
Qty: 90 TABLET | Refills: 0 | Status: SHIPPED | OUTPATIENT
Start: 2020-12-03 | End: 2021-01-25 | Stop reason: SDUPTHER

## 2020-12-03 NOTE — TELEPHONE ENCOUNTER
Patient called stating she needs a RF on her Levothyroxine 112 mcg to [facility],  Last seen in July/ has 6 month f/u scheduled for January with labs prior to appt.  Thanks.  cc

## 2021-01-18 ENCOUNTER — LAB (OUTPATIENT)
Dept: FAMILY MEDICINE CLINIC | Facility: CLINIC | Age: 76
End: 2021-01-18

## 2021-01-18 DIAGNOSIS — E53.8 B12 DEFICIENCY: ICD-10-CM

## 2021-01-18 DIAGNOSIS — R73.03 BORDERLINE DIABETES MELLITUS: ICD-10-CM

## 2021-01-18 DIAGNOSIS — I10 ESSENTIAL HYPERTENSION: ICD-10-CM

## 2021-01-18 DIAGNOSIS — E03.9 ACQUIRED HYPOTHYROIDISM: Primary | ICD-10-CM

## 2021-01-18 PROCEDURE — 85027 COMPLETE CBC AUTOMATED: CPT | Performed by: NURSE PRACTITIONER

## 2021-01-18 PROCEDURE — 84443 ASSAY THYROID STIM HORMONE: CPT | Performed by: NURSE PRACTITIONER

## 2021-01-18 PROCEDURE — 80053 COMPREHEN METABOLIC PANEL: CPT | Performed by: NURSE PRACTITIONER

## 2021-01-18 PROCEDURE — 36415 COLL VENOUS BLD VENIPUNCTURE: CPT | Performed by: NURSE PRACTITIONER

## 2021-01-18 PROCEDURE — 80061 LIPID PANEL: CPT | Performed by: NURSE PRACTITIONER

## 2021-01-19 LAB
ALBUMIN SERPL-MCNC: 4.9 G/DL (ref 3.5–5.2)
ALBUMIN/GLOB SERPL: 2 G/DL
ALP SERPL-CCNC: 57 U/L (ref 39–117)
ALT SERPL W P-5'-P-CCNC: 16 U/L (ref 1–33)
ANION GAP SERPL CALCULATED.3IONS-SCNC: 10.8 MMOL/L (ref 5–15)
AST SERPL-CCNC: 23 U/L (ref 1–32)
BILIRUB SERPL-MCNC: 0.5 MG/DL (ref 0–1.2)
BUN SERPL-MCNC: 9 MG/DL (ref 8–23)
BUN/CREAT SERPL: 17.6 (ref 7–25)
CALCIUM SPEC-SCNC: 9.3 MG/DL (ref 8.6–10.5)
CHLORIDE SERPL-SCNC: 89 MMOL/L (ref 98–107)
CHOLEST SERPL-MCNC: 179 MG/DL (ref 0–200)
CO2 SERPL-SCNC: 29.2 MMOL/L (ref 22–29)
CREAT SERPL-MCNC: 0.51 MG/DL (ref 0.57–1)
DEPRECATED RDW RBC AUTO: 43.1 FL (ref 37–54)
ERYTHROCYTE [DISTWIDTH] IN BLOOD BY AUTOMATED COUNT: 13.3 % (ref 12.3–15.4)
GFR SERPL CREATININE-BSD FRML MDRD: 118 ML/MIN/1.73
GLOBULIN UR ELPH-MCNC: 2.5 GM/DL
GLUCOSE SERPL-MCNC: 100 MG/DL (ref 65–99)
HCT VFR BLD AUTO: 42.5 % (ref 34–46.6)
HDLC SERPL-MCNC: 81 MG/DL (ref 40–60)
HGB BLD-MCNC: 13.8 G/DL (ref 12–15.9)
LDLC SERPL CALC-MCNC: 86 MG/DL (ref 0–100)
LDLC/HDLC SERPL: 1.05 {RATIO}
MCH RBC QN AUTO: 29 PG (ref 26.6–33)
MCHC RBC AUTO-ENTMCNC: 32.5 G/DL (ref 31.5–35.7)
MCV RBC AUTO: 89.3 FL (ref 79–97)
PLATELET # BLD AUTO: 267 10*3/MM3 (ref 140–450)
PMV BLD AUTO: 10.3 FL (ref 6–12)
POTASSIUM SERPL-SCNC: 3.7 MMOL/L (ref 3.5–5.2)
PROT SERPL-MCNC: 7.4 G/DL (ref 6–8.5)
RBC # BLD AUTO: 4.76 10*6/MM3 (ref 3.77–5.28)
SODIUM SERPL-SCNC: 129 MMOL/L (ref 136–145)
TRIGL SERPL-MCNC: 63 MG/DL (ref 0–150)
TSH SERPL DL<=0.05 MIU/L-ACNC: 5.5 UIU/ML (ref 0.27–4.2)
VLDLC SERPL-MCNC: 12 MG/DL (ref 5–40)
WBC # BLD AUTO: 6.14 10*3/MM3 (ref 3.4–10.8)

## 2021-01-25 ENCOUNTER — OFFICE VISIT (OUTPATIENT)
Dept: FAMILY MEDICINE CLINIC | Facility: CLINIC | Age: 76
End: 2021-01-25

## 2021-01-25 VITALS
BODY MASS INDEX: 30.92 KG/M2 | HEART RATE: 65 BPM | OXYGEN SATURATION: 97 % | DIASTOLIC BLOOD PRESSURE: 76 MMHG | HEIGHT: 66 IN | TEMPERATURE: 97.5 F | WEIGHT: 192.4 LBS | SYSTOLIC BLOOD PRESSURE: 154 MMHG

## 2021-01-25 DIAGNOSIS — I10 ESSENTIAL HYPERTENSION: ICD-10-CM

## 2021-01-25 DIAGNOSIS — J45.40 MODERATE PERSISTENT ASTHMA WITHOUT COMPLICATION: ICD-10-CM

## 2021-01-25 DIAGNOSIS — L82.1 SK (SEBORRHEIC KERATOSIS): ICD-10-CM

## 2021-01-25 DIAGNOSIS — Z86.010 HX OF COLONIC POLYPS: ICD-10-CM

## 2021-01-25 DIAGNOSIS — E78.2 MIXED HYPERLIPIDEMIA: ICD-10-CM

## 2021-01-25 DIAGNOSIS — E03.9 ACQUIRED HYPOTHYROIDISM: ICD-10-CM

## 2021-01-25 DIAGNOSIS — Z00.00 PREVENTATIVE HEALTH CARE: Primary | ICD-10-CM

## 2021-01-25 DIAGNOSIS — M15.9 OSTEOARTHRITIS OF MULTIPLE JOINTS, UNSPECIFIED OSTEOARTHRITIS TYPE: ICD-10-CM

## 2021-01-25 PROBLEM — Z80.3 FAMILY HISTORY OF MALIGNANT NEOPLASM OF BREAST: Status: ACTIVE | Noted: 2018-03-20

## 2021-01-25 PROBLEM — H35.30 MACULAR DEGENERATION: Status: ACTIVE | Noted: 2018-09-10

## 2021-01-25 PROBLEM — J44.9 ASTHMA WITH COPD (HCC): Status: ACTIVE | Noted: 2018-03-20

## 2021-01-25 PROBLEM — N60.19 FIBROCYSTIC BREAST CHANGES: Status: ACTIVE | Noted: 2018-03-20

## 2021-01-25 PROBLEM — J44.89 ASTHMA WITH COPD: Status: ACTIVE | Noted: 2018-03-20

## 2021-01-25 PROCEDURE — 99214 OFFICE O/P EST MOD 30 MIN: CPT | Performed by: NURSE PRACTITIONER

## 2021-01-25 RX ORDER — AMLODIPINE BESYLATE 10 MG/1
10 TABLET ORAL DAILY
Qty: 90 TABLET | Refills: 1 | Status: SHIPPED | OUTPATIENT
Start: 2021-01-25 | End: 2021-04-07 | Stop reason: SDUPTHER

## 2021-01-25 RX ORDER — ATORVASTATIN CALCIUM 10 MG/1
10 TABLET, FILM COATED ORAL DAILY
Qty: 90 TABLET | Refills: 1 | Status: SHIPPED | OUTPATIENT
Start: 2021-01-25 | End: 2021-07-26 | Stop reason: SDUPTHER

## 2021-01-25 RX ORDER — LEVOTHYROXINE SODIUM 0.12 MG/1
125 TABLET ORAL DAILY
Qty: 90 TABLET | Refills: 1 | Status: SHIPPED | OUTPATIENT
Start: 2021-01-25 | End: 2021-07-19

## 2021-01-25 RX ORDER — HYDROCHLOROTHIAZIDE 25 MG/1
25 TABLET ORAL DAILY
Qty: 90 TABLET | Refills: 1 | Status: SHIPPED | OUTPATIENT
Start: 2021-01-25 | End: 2021-07-26 | Stop reason: SDUPTHER

## 2021-01-25 RX ORDER — FOSINOPRIL SODIUM 20 MG/1
20 TABLET ORAL 2 TIMES DAILY
Qty: 180 TABLET | Refills: 1 | Status: SHIPPED | OUTPATIENT
Start: 2021-01-25 | End: 2021-07-26 | Stop reason: SDUPTHER

## 2021-01-25 NOTE — PROGRESS NOTES
"Chief Complaint  Results (labs 1/18/21) and Annual Exam    Subjective          Nafisa Washington presents to [facility] for   History of Present Illness     Patient is here today for annual exam and to review labs     HTN, patient reports BP running 140s to 150 systolic at home, takes medications as directed, denies chest pain, headache, shortness of air, palpitations and swelling of extremities.     Hypothyroidism, stable on medication, denies symptoms of constipation, weight gain/loss, hot or cold intolerance, hair loss, abnormal heart rate and fatigue.     Hyperlipidemia, The patient denies muscle aches, constipation, diarrhea, GI upset, fatigue, chest pain/pressure, exercise intolerance, dyspnea, palpitations, syncope and pedal edema.      She is now following with [place] [facility], joints of hands and both knees improved on sulfasalazine and bilateral knee injections as needed.     She has not rescheduled her colonoscopy due to COVID-19 pandemic    Complains of a thick scaly brown lesion on her back that she would like checked      Objective   Vital Signs:   /76 (BP Location: Left arm, Patient Position: Sitting, Cuff Size: Adult)   Pulse 65   Temp 97.5 °F (36.4 °C) (Infrared)   Ht 167.6 cm (65.98\")   Wt 87.3 kg (192 lb 6.4 oz)   SpO2 97%   BMI 31.07 kg/m²     Physical Exam  Vitals signs and nursing note reviewed.   Constitutional:       General: She is not in acute distress.     Appearance: She is well-developed. She is not diaphoretic.   Eyes:      Pupils: Pupils are equal, round, and reactive to light.   Neck:      Musculoskeletal: Normal range of motion and neck supple.      Thyroid: No thyromegaly.      Vascular: No JVD.   Cardiovascular:      Rate and Rhythm: Normal rate and regular rhythm.      Heart sounds: Normal heart sounds. No murmur.   Pulmonary:      Effort: Pulmonary effort is normal. No respiratory distress.      Breath sounds: Normal breath " sounds.   Abdominal:      General: Bowel sounds are normal. There is no distension.      Palpations: Abdomen is soft.      Tenderness: There is no abdominal tenderness.   Musculoskeletal: Normal range of motion.         General: Deformity (nodules of joints both hands/fingers impaired mobility) present. No swelling or tenderness.      Comments: Bilateral knee pain improved, good range of motion   Skin:     General: Skin is warm and dry.      Findings: Lesion (SK approx 0.5cm x1cm, brown, thick lesion left upperback, braline) present.   Neurological:      Mental Status: She is alert and oriented to person, place, and time.      Sensory: No sensory deficit.   Psychiatric:         Behavior: Behavior normal.         Thought Content: Thought content normal.         Judgment: Judgment normal.        Result Review :     Common labs    Common Labsle 7/22/20 7/22/20 7/22/20 1/18/21 1/18/21 1/18/21    0943 0943 0943 0800 0800 0800   BUN   13   9   Creatinine   0.63   0.51 (A)   eGFR Non African Am   93   118   Sodium   130 (A)   129 (A)   Potassium   4.0   3.7   Chloride   91 (A)   89 (A)   Calcium   9.9   9.3   Albumin   4.50   4.90   Total Bilirubin   0.4   0.5   Alkaline Phosphatase   66   57   AST (SGOT)   24   23   ALT (SGPT)   17   16   WBC 4.99   6.14     Hemoglobin 13.7   13.8     Hematocrit 42.5   42.5     Platelets 241   267     Triglycerides  75   63    HDL Cholesterol  72 (A)   81 (A)    LDL Cholesterol   123 (A)   86    (A) Abnormal value            TSH    TSH 7/22/20 9/10/20 1/18/21   TSH 13.200 (A) 4.510 (A) 5.500 (A)   (A) Abnormal value                        Assessment and Plan    Problem List Items Addressed This Visit        Cardiac and Vasculature    Hypertension    Relevant Medications    amLODIPine (NORVASC) 10 MG tablet    fosinopril (MONOPRIL) 20 MG tablet    hydroCHLOROthiazide (HYDRODIURIL) 25 MG tablet       Endocrine and Metabolic    Hypothyroidism    Relevant Medications    levothyroxine  (SYNTHROID, LEVOTHROID) 125 MCG tablet    Other Relevant Orders    TSH      Other Visit Diagnoses     Preventative health care    -  Primary    Relevant Medications    pneumococcal polysaccharide 23-valent (PNEUMOVAX-23) vaccine 0.5 mL (Start on 1/25/2021  9:57 AM)    Mixed hyperlipidemia        Relevant Medications    atorvastatin (LIPITOR) 10 MG tablet    Moderate persistent asthma without complication        Relevant Medications    fluticasone-salmeterol (Advair Diskus) 100-50 MCG/DOSE DISKUS    Hx of colonic polyps        Osteoarthritis of multiple joints, unspecified osteoarthritis type        SK (seborrheic keratosis)              1.  BP slightly elevated, increase amlodipine to 10 mg, continue fosinopril twice daily and hydrochlorothiazide  2.  Reviewed all labs with patient mostly stable with hypoactive thyroid  -Increase levothyroxine to 125  -TSH in 6 weeks  3.  [id number] vaccine today  -Patient to call to reschedule colonoscopy due to history of polyps  4.  Lipids stable continue atorvastatin/refill  5.  Asthma stable, continue Advair twice daily, refill  6.  Will reschedule patient for cryotherapy to SK when Verruca-Freeze is available  7.  Keep follow-ups with rheumatology as directed, continue sulfasalazine      I spent 25 minutes caring for Nafisa on this date of service. This time includes time spent by me in the following activities:preparing for the visit, reviewing tests, performing a medically appropriate examination and/or evaluation , counseling and educating the patient/family/caregiver, ordering medications, tests, or procedures and documenting information in the medical record     Follow Up   Return in about 6 months (around 7/25/2021) for TSH in 6 wks. f/u in 6mo for HTN, lipids, thyroid, HTN panel 1 wk prior, also Medicare Wellness .  Patient was given instructions and counseling regarding her condition or for health maintenance advice. Please see specific information pulled into the  AVS if appropriate.

## 2021-02-08 DIAGNOSIS — J45.40 MODERATE PERSISTENT ASTHMA WITHOUT COMPLICATION: ICD-10-CM

## 2021-02-08 NOTE — TELEPHONE ENCOUNTER
Caller: Nafisa Washington    Relationship: Self    Best call back number: [phone]    Medication needed:   Requested Prescriptions     Pending Prescriptions Disp Refills   • fluticasone-salmeterol (Advair Diskus) 100-50 MCG/DOSE DISKUS 60 each 5     Sig: Inhale 1 puff 2 (Two) Times a Day.       When do you need the refill by: 2/8/21    What details did the patient provide when requesting the medication: INS WILL NOT COVER WIXELA AND SHE NEEDS TO SWITCH BACK TO MEDICATION ABOVE.     Does the patient have less than a 3 day supply:  [x] Yes  [] No    What is the patient's preferred pharmacy:    [facility]/pharmacy #[id number] - [place] - [address] - [phone] [facility] [phone]   [phone]

## 2021-02-22 DIAGNOSIS — I10 ESSENTIAL HYPERTENSION: ICD-10-CM

## 2021-02-22 RX ORDER — AMLODIPINE BESYLATE 5 MG/1
TABLET ORAL
Qty: 90 TABLET | Refills: 1 | OUTPATIENT
Start: 2021-02-22

## 2021-03-11 ENCOUNTER — LAB (OUTPATIENT)
Dept: FAMILY MEDICINE CLINIC | Facility: CLINIC | Age: 76
End: 2021-03-11

## 2021-03-11 DIAGNOSIS — E03.9 ACQUIRED HYPOTHYROIDISM: ICD-10-CM

## 2021-03-11 PROCEDURE — 84443 ASSAY THYROID STIM HORMONE: CPT | Performed by: NURSE PRACTITIONER

## 2021-03-11 PROCEDURE — 36415 COLL VENOUS BLD VENIPUNCTURE: CPT | Performed by: NURSE PRACTITIONER

## 2021-03-12 LAB — TSH SERPL DL<=0.05 MIU/L-ACNC: 2.03 UIU/ML (ref 0.27–4.2)

## 2021-04-04 DIAGNOSIS — I10 ESSENTIAL HYPERTENSION: ICD-10-CM

## 2021-04-05 RX ORDER — AMLODIPINE BESYLATE 5 MG/1
TABLET ORAL
Qty: 90 TABLET | Refills: 1 | Status: SHIPPED | OUTPATIENT
Start: 2021-04-05 | End: 2021-04-07

## 2021-04-05 RX ORDER — ALBUTEROL SULFATE 90 UG/1
AEROSOL, METERED RESPIRATORY (INHALATION)
Qty: 18 G | Refills: 2 | Status: SHIPPED | OUTPATIENT
Start: 2021-04-05 | End: 2021-06-25

## 2021-04-07 DIAGNOSIS — I10 ESSENTIAL HYPERTENSION: ICD-10-CM

## 2021-04-07 RX ORDER — AMLODIPINE BESYLATE 10 MG/1
10 TABLET ORAL DAILY
Qty: 90 TABLET | Refills: 1 | Status: SHIPPED | OUTPATIENT
Start: 2021-04-07 | End: 2021-07-26 | Stop reason: SDUPTHER

## 2021-04-07 NOTE — TELEPHONE ENCOUNTER
PATIENT ADVISED THAT THE WRONG MG OF AMLODIPINE WAS SENT TO THE PHARMACY.     PATIENT IS REQUESTING A REFILL ON amLODIPine (NORVASC) 10 MG tablet    [facility]/pharmacy #[id number] - [place], [place] - [address] - [phone]  - [phone] FX

## 2021-06-25 RX ORDER — ALBUTEROL SULFATE 90 UG/1
AEROSOL, METERED RESPIRATORY (INHALATION)
Qty: 18 G | Refills: 2 | Status: SHIPPED | OUTPATIENT
Start: 2021-06-25 | End: 2021-09-03

## 2021-07-18 DIAGNOSIS — E03.9 ACQUIRED HYPOTHYROIDISM: ICD-10-CM

## 2021-07-19 ENCOUNTER — LAB (OUTPATIENT)
Dept: FAMILY MEDICINE CLINIC | Facility: CLINIC | Age: 76
End: 2021-07-19

## 2021-07-19 DIAGNOSIS — I10 HYPERTENSION, UNSPECIFIED TYPE: Primary | ICD-10-CM

## 2021-07-19 DIAGNOSIS — E03.9 HYPOTHYROIDISM, UNSPECIFIED TYPE: ICD-10-CM

## 2021-07-19 PROCEDURE — 80053 COMPREHEN METABOLIC PANEL: CPT | Performed by: NURSE PRACTITIONER

## 2021-07-19 PROCEDURE — 80061 LIPID PANEL: CPT | Performed by: NURSE PRACTITIONER

## 2021-07-19 PROCEDURE — 36415 COLL VENOUS BLD VENIPUNCTURE: CPT | Performed by: NURSE PRACTITIONER

## 2021-07-19 PROCEDURE — 84443 ASSAY THYROID STIM HORMONE: CPT | Performed by: NURSE PRACTITIONER

## 2021-07-19 PROCEDURE — 85027 COMPLETE CBC AUTOMATED: CPT | Performed by: NURSE PRACTITIONER

## 2021-07-19 RX ORDER — LEVOTHYROXINE SODIUM 0.12 MG/1
TABLET ORAL
Qty: 90 TABLET | Refills: 0 | Status: SHIPPED | OUTPATIENT
Start: 2021-07-19 | End: 2021-07-26 | Stop reason: SDUPTHER

## 2021-07-20 LAB
ALBUMIN SERPL-MCNC: 4.6 G/DL (ref 3.5–5.2)
ALBUMIN/GLOB SERPL: 1.9 G/DL
ALP SERPL-CCNC: 63 U/L (ref 39–117)
ALT SERPL W P-5'-P-CCNC: 16 U/L (ref 1–33)
ANION GAP SERPL CALCULATED.3IONS-SCNC: 11.3 MMOL/L (ref 5–15)
AST SERPL-CCNC: 25 U/L (ref 1–32)
BILIRUB SERPL-MCNC: 0.3 MG/DL (ref 0–1.2)
BUN SERPL-MCNC: 11 MG/DL (ref 8–23)
BUN/CREAT SERPL: 18.6 (ref 7–25)
CALCIUM SPEC-SCNC: 9.5 MG/DL (ref 8.6–10.5)
CHLORIDE SERPL-SCNC: 91 MMOL/L (ref 98–107)
CHOLEST SERPL-MCNC: 197 MG/DL (ref 0–200)
CO2 SERPL-SCNC: 25.7 MMOL/L (ref 22–29)
CREAT SERPL-MCNC: 0.59 MG/DL (ref 0.57–1)
DEPRECATED RDW RBC AUTO: 43.7 FL (ref 37–54)
ERYTHROCYTE [DISTWIDTH] IN BLOOD BY AUTOMATED COUNT: 13.1 % (ref 12.3–15.4)
GFR SERPL CREATININE-BSD FRML MDRD: 100 ML/MIN/1.73
GLOBULIN UR ELPH-MCNC: 2.4 GM/DL
GLUCOSE SERPL-MCNC: 90 MG/DL (ref 65–99)
HCT VFR BLD AUTO: 41.4 % (ref 34–46.6)
HDLC SERPL-MCNC: 81 MG/DL (ref 40–60)
HGB BLD-MCNC: 13.4 G/DL (ref 12–15.9)
LDLC SERPL CALC-MCNC: 104 MG/DL (ref 0–100)
LDLC/HDLC SERPL: 1.27 {RATIO}
MCH RBC QN AUTO: 29.5 PG (ref 26.6–33)
MCHC RBC AUTO-ENTMCNC: 32.4 G/DL (ref 31.5–35.7)
MCV RBC AUTO: 91 FL (ref 79–97)
PLATELET # BLD AUTO: 266 10*3/MM3 (ref 140–450)
PMV BLD AUTO: 10.8 FL (ref 6–12)
POTASSIUM SERPL-SCNC: 4.2 MMOL/L (ref 3.5–5.2)
PROT SERPL-MCNC: 7 G/DL (ref 6–8.5)
RBC # BLD AUTO: 4.55 10*6/MM3 (ref 3.77–5.28)
SODIUM SERPL-SCNC: 128 MMOL/L (ref 136–145)
TRIGL SERPL-MCNC: 65 MG/DL (ref 0–150)
TSH SERPL DL<=0.05 MIU/L-ACNC: 5.67 UIU/ML (ref 0.27–4.2)
VLDLC SERPL-MCNC: 12 MG/DL (ref 5–40)
WBC # BLD AUTO: 5.35 10*3/MM3 (ref 3.4–10.8)

## 2021-07-26 ENCOUNTER — OFFICE VISIT (OUTPATIENT)
Dept: FAMILY MEDICINE CLINIC | Facility: CLINIC | Age: 76
End: 2021-07-26

## 2021-07-26 VITALS
OXYGEN SATURATION: 98 % | HEART RATE: 68 BPM | DIASTOLIC BLOOD PRESSURE: 70 MMHG | WEIGHT: 188.6 LBS | BODY MASS INDEX: 30.31 KG/M2 | HEIGHT: 66 IN | SYSTOLIC BLOOD PRESSURE: 128 MMHG

## 2021-07-26 DIAGNOSIS — E87.1 HYPONATREMIA: ICD-10-CM

## 2021-07-26 DIAGNOSIS — J45.40 MODERATE PERSISTENT ASTHMA WITHOUT COMPLICATION: ICD-10-CM

## 2021-07-26 DIAGNOSIS — I10 ESSENTIAL HYPERTENSION: ICD-10-CM

## 2021-07-26 DIAGNOSIS — Z12.31 BREAST CANCER SCREENING BY MAMMOGRAM: Primary | ICD-10-CM

## 2021-07-26 DIAGNOSIS — G47.09 OTHER INSOMNIA: ICD-10-CM

## 2021-07-26 DIAGNOSIS — E78.2 MIXED HYPERLIPIDEMIA: ICD-10-CM

## 2021-07-26 DIAGNOSIS — E03.9 ACQUIRED HYPOTHYROIDISM: ICD-10-CM

## 2021-07-26 DIAGNOSIS — Z00.00 PREVENTATIVE HEALTH CARE: ICD-10-CM

## 2021-07-26 PROCEDURE — 99214 OFFICE O/P EST MOD 30 MIN: CPT | Performed by: NURSE PRACTITIONER

## 2021-07-26 RX ORDER — ATORVASTATIN CALCIUM 10 MG/1
10 TABLET, FILM COATED ORAL DAILY
Qty: 90 TABLET | Refills: 1 | Status: SHIPPED | OUTPATIENT
Start: 2021-07-26 | End: 2022-01-03

## 2021-07-26 RX ORDER — FOSINOPRIL SODIUM 20 MG/1
20 TABLET ORAL 2 TIMES DAILY
Qty: 180 TABLET | Refills: 1 | Status: SHIPPED | OUTPATIENT
Start: 2021-07-26 | End: 2022-01-07

## 2021-07-26 RX ORDER — HYDROCHLOROTHIAZIDE 25 MG/1
25 TABLET ORAL DAILY
Qty: 90 TABLET | Refills: 1 | Status: SHIPPED | OUTPATIENT
Start: 2021-07-26 | End: 2022-01-03

## 2021-07-26 RX ORDER — LEVOTHYROXINE SODIUM 0.15 MG/1
150 TABLET ORAL DAILY
Qty: 90 TABLET | Refills: 1 | Status: SHIPPED | OUTPATIENT
Start: 2021-07-26 | End: 2021-09-09

## 2021-07-26 RX ORDER — AMLODIPINE BESYLATE 10 MG/1
10 TABLET ORAL DAILY
Qty: 90 TABLET | Refills: 1 | Status: SHIPPED | OUTPATIENT
Start: 2021-07-26 | End: 2022-01-25

## 2021-07-26 NOTE — PROGRESS NOTES
Chief Complaint  Hypertension, Hypothyroidism, Hyperlipidemia, Follow-up (6 mo; pt refused mwv and said she will complete with next f/u.), and Fatigue    Subjective          Nafisa Washington presents to [facility] for   History of Present Illness     HTN, BP improved taking amlodopine 10mg at night, fosinopril and HCTZ in a.m. as directed, denies chest pain, headache, shortness of air, palpitations and does report swelling of the lower extremities after being on her feet most of the day.      Hypothyroidism, takes levothyroxine same time every day.  She reports increased fatigue, denies symptoms of constipation, weight gain/loss, hot or cold intolerance, hair loss, abnormal heart rate.      Hyperlipidemia, The patient denies muscle aches, constipation, diarrhea, GI upset, fatigue, chest pain/pressure, exercise intolerance, dyspnea, palpitations, syncope and pedal edema.       She follows with [facility], joints of hands and both knees improved on sulfasalazine and bilateral knee injections as needed.      She has not rescheduled her colonoscopy due to COVID-19 pandemic and other family conflicts.      Insomnia, This is a new problem, with family stress, she reports daytime fatigue and has not tried anything OTC.        The following portions of the patient's history were reviewed and updated as appropriate: allergies, current medications, past family history, past medical history, past social history, past surgical history and problem list.    Past Medical History:   Diagnosis Date   • Asthma    • Disease of thyroid gland    • Hyperlipidemia    • Hypertension    • Macular degeneration      Past Surgical History:   Procedure Laterality Date   • ADENOIDECTOMY     • DENTAL PROCEDURE     • HYSTERECTOMY      Full    • TONSILLECTOMY       History reviewed. No pertinent family history.  Social History     Tobacco Use   • Smoking status: Never Smoker   • Smokeless tobacco:  "Never Used   Substance Use Topics   • Alcohol use: Never       Current Outpatient Medications:   •  albuterol sulfate  (90 Base) MCG/ACT inhaler, INHLE 2 PUFFS 4 TIMES A DAY AS NEEDED, Disp: 18 g, Rfl: 2  •  amLODIPine (NORVASC) 10 MG tablet, Take 1 tablet by mouth Daily., Disp: 90 tablet, Rfl: 1  •  atorvastatin (LIPITOR) 10 MG tablet, Take 1 tablet by mouth Daily., Disp: 90 tablet, Rfl: 1  •  cholecalciferol (VITAMIN D3) 1000 units tablet, Take 1,000 Units by mouth Daily., Disp: , Rfl:   •  Ferrous Sulfate Dried ER (SLOW IRON) 160 (50 Fe) MG tablet controlled-release, Take  by mouth., Disp: , Rfl:   •  fluticasone-salmeterol (Advair Diskus) 100-50 MCG/DOSE DISKUS, Inhale 1 puff 2 (Two) Times a Day., Disp: 60 each, Rfl: 5  •  fosinopril (MONOPRIL) 20 MG tablet, Take 1 tablet by mouth 2 (Two) Times a Day., Disp: 180 tablet, Rfl: 1  •  hydroCHLOROthiazide (HYDRODIURIL) 25 MG tablet, Take 1 tablet by mouth Daily., Disp: 90 tablet, Rfl: 1  •  levothyroxine (SYNTHROID, LEVOTHROID) 150 MCG tablet, Take 1 tablet by mouth Daily., Disp: 90 tablet, Rfl: 1  •  Multiple Vitamin (MULTI-VITAMIN DAILY PO), Take  by mouth., Disp: , Rfl:   •  Multiple Vitamins-Minerals (EYE VITAMINS PO), Take  by mouth., Disp: , Rfl:   •  multivitamins-minerals (PRESERVISION AREDS 2) capsule capsule, Take 1 capsule by mouth 2 (Two) Times a Day., Disp: , Rfl:   •  Naproxen Sodium (ALEVE) 220 MG capsule, Take 1 tablet by mouth 4 (Four) Times a Day., Disp: , Rfl:   •  sulfaSALAzine (AZULFIDINE) 500 MG tablet, Take 500 mg by mouth 2 (Two) Times a Day., Disp: , Rfl:     Objective   Vital Signs:   /70 (BP Location: Left arm, Patient Position: Sitting, Cuff Size: Adult)   Pulse 68   Ht 167.6 cm (65.98\")   Wt 85.5 kg (188 lb 9.6 oz)   SpO2 98%   BMI 30.46 kg/m²       Physical Exam  Vitals and nursing note reviewed.   Constitutional:       General: She is not in acute distress.     Appearance: She is well-developed. She is not diaphoretic. "   Eyes:      Pupils: Pupils are equal, round, and reactive to light.   Neck:      Thyroid: No thyromegaly.      Vascular: No JVD.   Cardiovascular:      Rate and Rhythm: Normal rate and regular rhythm.      Heart sounds: Normal heart sounds. No murmur heard.     Pulmonary:      Effort: Pulmonary effort is normal. No respiratory distress.      Breath sounds: Normal breath sounds.   Abdominal:      General: Bowel sounds are normal. There is no distension.      Palpations: Abdomen is soft.      Tenderness: There is no abdominal tenderness.   Musculoskeletal:         General: Tenderness (B chronic knee pain, good rom.) and deformity (nodules of fingers both hands, good rom.  ) present. Normal range of motion.      Cervical back: Normal range of motion and neck supple.   Skin:     General: Skin is warm and dry.   Neurological:      Mental Status: She is alert and oriented to person, place, and time.      Sensory: No sensory deficit.   Psychiatric:         Behavior: Behavior normal.         Thought Content: Thought content normal.         Judgment: Judgment normal.          Result Review :     No visits with results within 7 Day(s) from this visit.   Latest known visit with results is:   Lab on 07/19/2021   Component Date Value Ref Range Status   • WBC 07/19/2021 5.35  3.40 - 10.80 10*3/mm3 Final   • RBC 07/19/2021 4.55  3.77 - 5.28 10*6/mm3 Final   • Hemoglobin 07/19/2021 13.4  12.0 - 15.9 g/dL Final   • Hematocrit 07/19/2021 41.4  34.0 - 46.6 % Final   • MCV 07/19/2021 91.0  79.0 - 97.0 fL Final   • MCH 07/19/2021 29.5  26.6 - 33.0 pg Final   • MCHC 07/19/2021 32.4  31.5 - 35.7 g/dL Final   • RDW 07/19/2021 13.1  12.3 - 15.4 % Final   • RDW-SD 07/19/2021 43.7  37.0 - 54.0 fl Final   • MPV 07/19/2021 10.8  6.0 - 12.0 fL Final   • Platelets 07/19/2021 266  140 - 450 10*3/mm3 Final   • Glucose 07/19/2021 90  65 - 99 mg/dL Final   • BUN 07/19/2021 11  8 - 23 mg/dL Final   • Creatinine 07/19/2021 0.59  0.57 - 1.00 mg/dL  Final   • Sodium 07/19/2021 128* 136 - 145 mmol/L Final   • Potassium 07/19/2021 4.2  3.5 - 5.2 mmol/L Final   • Chloride 07/19/2021 91* 98 - 107 mmol/L Final   • CO2 07/19/2021 25.7  22.0 - 29.0 mmol/L Final   • Calcium 07/19/2021 9.5  8.6 - 10.5 mg/dL Final   • Total Protein 07/19/2021 7.0  6.0 - 8.5 g/dL Final   • Albumin 07/19/2021 4.60  3.50 - 5.20 g/dL Final   • ALT (SGPT) 07/19/2021 16  1 - 33 U/L Final   • AST (SGOT) 07/19/2021 25  1 - 32 U/L Final   • Alkaline Phosphatase 07/19/2021 63  39 - 117 U/L Final   • Total Bilirubin 07/19/2021 0.3  0.0 - 1.2 mg/dL Final   • eGFR Non African Amer 07/19/2021 100  >60 mL/min/1.73 Final   • Globulin 07/19/2021 2.4  gm/dL Final   • A/G Ratio 07/19/2021 1.9  g/dL Final   • BUN/Creatinine Ratio 07/19/2021 18.6  7.0 - 25.0 Final   • Anion Gap 07/19/2021 11.3  5.0 - 15.0 mmol/L Final   • Total Cholesterol 07/19/2021 197  0 - 200 mg/dL Final   • Triglycerides 07/19/2021 65  0 - 150 mg/dL Final   • HDL Cholesterol 07/19/2021 81* 40 - 60 mg/dL Final   • LDL Cholesterol  07/19/2021 104* 0 - 100 mg/dL Final   • VLDL Cholesterol 07/19/2021 12  5 - 40 mg/dL Final   • LDL/HDL Ratio 07/19/2021 1.27   Final   • TSH 07/19/2021 5.670* 0.270 - 4.200 uIU/mL Final                       Assessment and Plan    Diagnoses and all orders for this visit:    1. Breast cancer screening by mammogram (Primary)  -     Mammo Screening Digital Tomosynthesis Bilateral With CAD; Future    2. Essential hypertension  -     amLODIPine (NORVASC) 10 MG tablet; Take 1 tablet by mouth Daily.  Dispense: 90 tablet; Refill: 1  -     hydroCHLOROthiazide (HYDRODIURIL) 25 MG tablet; Take 1 tablet by mouth Daily.  Dispense: 90 tablet; Refill: 1    3. Mixed hyperlipidemia  -     atorvastatin (LIPITOR) 10 MG tablet; Take 1 tablet by mouth Daily.  Dispense: 90 tablet; Refill: 1    4. Moderate persistent asthma without complication  -     fluticasone-salmeterol (Advair Diskus) 100-50 MCG/DOSE DISKUS; Inhale 1 puff 2  (Two) Times a Day.  Dispense: 60 each; Refill: 5    5. Acquired hypothyroidism  -     levothyroxine (SYNTHROID, LEVOTHROID) 150 MCG tablet; Take 1 tablet by mouth Daily.  Dispense: 90 tablet; Refill: 1    6. Preventative health care    7. Other insomnia  Comments:  try melatonin at night    8. Hyponatremia    Other orders  -     fosinopril (MONOPRIL) 20 MG tablet; Take 1 tablet by mouth 2 (Two) Times a Day.  Dispense: 180 tablet; Refill: 1        1.  Conditions stable, rf meds as above  2.  Thyroid hypoactive, increase levothyroxine to 150 mcg daily, TSH in 6 weeks  3.  Reviewed all other labs with patient and stable  4.  Likely secondary to HCTZ, okay for occasional salt use with food.  5.  Mammogram ordered and due in October   6.  Continue following with rheumatology as directed  7.  Eye exam yearly as directed as well  8.  Patient to call and schedule colonoscopy, with history of polyps      I spent 30 minutes caring for Nafisa Washington on this date of service. This time includes time spent by me in the following activities: preparing for the visit, reviewing tests, performing a medically appropriate examination and/or evaluation , counseling and educating the patient/family/caregiver, ordering medications, tests, or procedures and documenting information in the medical record        Follow Up     Return in about 6 months (around 1/26/2022) for HTN, lipids, thyroid. HTN panel 1 week prior to appt. TSH in 6 weeks.  Patient was given instructions and counseling regarding her condition or for health maintenance advice. Please see specific information pulled into the AVS if appropriate.      EMR Dragon transcription disclaimer:  Some of this encounter note is an electronic transcription translation of spoken language to printed text. The electronic translation of spoken language may permit erroneous, or at times, nonsensical words or phrases to be inadvertently transcribed; Although I have reviewed the note for  such errors some may still exist.

## 2021-09-03 RX ORDER — ALBUTEROL SULFATE 90 UG/1
AEROSOL, METERED RESPIRATORY (INHALATION)
Qty: 18 G | Refills: 2 | Status: SHIPPED | OUTPATIENT
Start: 2021-09-03 | End: 2022-03-28

## 2021-09-07 ENCOUNTER — LAB (OUTPATIENT)
Dept: FAMILY MEDICINE CLINIC | Facility: CLINIC | Age: 76
End: 2021-09-07

## 2021-09-07 DIAGNOSIS — E03.9 ACQUIRED HYPOTHYROIDISM: Primary | ICD-10-CM

## 2021-09-08 ENCOUNTER — LAB (OUTPATIENT)
Dept: FAMILY MEDICINE CLINIC | Facility: CLINIC | Age: 76
End: 2021-09-08

## 2021-09-08 DIAGNOSIS — E03.9 HYPOTHYROIDISM, UNSPECIFIED TYPE: ICD-10-CM

## 2021-09-08 PROCEDURE — 84443 ASSAY THYROID STIM HORMONE: CPT | Performed by: NURSE PRACTITIONER

## 2021-09-08 PROCEDURE — 36415 COLL VENOUS BLD VENIPUNCTURE: CPT | Performed by: NURSE PRACTITIONER

## 2021-09-09 DIAGNOSIS — E03.9 ACQUIRED HYPOTHYROIDISM: ICD-10-CM

## 2021-09-09 LAB — TSH SERPL DL<=0.05 MIU/L-ACNC: 0.03 UIU/ML (ref 0.27–4.2)

## 2021-09-09 RX ORDER — LEVOTHYROXINE SODIUM 137 UG/1
137 TABLET ORAL DAILY
Qty: 30 TABLET | Refills: 1 | Status: SHIPPED | OUTPATIENT
Start: 2021-09-09 | End: 2021-11-03

## 2021-10-07 ENCOUNTER — FLU SHOT (OUTPATIENT)
Dept: FAMILY MEDICINE CLINIC | Facility: CLINIC | Age: 76
End: 2021-10-07

## 2021-10-07 DIAGNOSIS — Z23 NEED FOR INFLUENZA VACCINATION: Primary | ICD-10-CM

## 2021-10-07 PROCEDURE — G0008 ADMIN INFLUENZA VIRUS VAC: HCPCS | Performed by: NURSE PRACTITIONER

## 2021-10-07 PROCEDURE — 90662 IIV NO PRSV INCREASED AG IM: CPT | Performed by: NURSE PRACTITIONER

## 2021-10-07 NOTE — PROGRESS NOTES
Injection  Injection performed in left deltoid by Althea Garcia MA. Patient tolerated the procedure well without complications.  10/07/21   Althea Garcia MA

## 2021-10-14 DIAGNOSIS — E03.9 ACQUIRED HYPOTHYROIDISM: ICD-10-CM

## 2021-10-14 RX ORDER — LEVOTHYROXINE SODIUM 0.12 MG/1
TABLET ORAL
Qty: 90 TABLET | Refills: 0 | OUTPATIENT
Start: 2021-10-14

## 2021-10-16 ENCOUNTER — HOSPITAL ENCOUNTER (EMERGENCY)
Facility: HOSPITAL | Age: 76
Discharge: HOME | End: 2021-10-16
Admitting: EMERGENCY MEDICINE

## 2021-10-16 ENCOUNTER — APPOINTMENT (OUTPATIENT)
Dept: CT IMAGING | Facility: HOSPITAL | Age: 76
End: 2021-10-16

## 2021-10-16 VITALS
SYSTOLIC BLOOD PRESSURE: 144 MMHG | RESPIRATION RATE: 18 BRPM | BODY MASS INDEX: 29.52 KG/M2 | DIASTOLIC BLOOD PRESSURE: 61 MMHG | TEMPERATURE: 97.9 F | HEART RATE: 69 BPM | HEIGHT: 67 IN | OXYGEN SATURATION: 95 % | WEIGHT: 188.05 LBS

## 2021-10-16 DIAGNOSIS — S01.81XA FACIAL LACERATION, INITIAL ENCOUNTER: Primary | ICD-10-CM

## 2021-10-16 PROCEDURE — 99282 EMERGENCY DEPT VISIT SF MDM: CPT

## 2021-10-16 PROCEDURE — 70486 CT MAXILLOFACIAL W/O DYE: CPT

## 2021-10-16 NOTE — ED PROVIDER NOTES
Subjective   History:    76-year-old female presents the emergency room today for evaluation of a laceration above her right brow that she sustained after a fall that she had at [employer] about an hour prior to arrival.  Reports that she tripped and fell hitting her head.  There was a nurse that humerus that came and cleaned up and she came here for suturing.  She denies blurry vision, headache, loss of consciousness, blood thinner use.              Review of Systems   Constitutional: Negative for appetite change, chills, fatigue and fever.   HENT: Negative for congestion, facial swelling, sinus pain and sore throat.    Eyes: Negative for pain and visual disturbance.   Respiratory: Negative for cough, chest tightness and shortness of breath.    Cardiovascular: Negative for chest pain and palpitations.   Gastrointestinal: Negative for constipation, diarrhea, nausea and vomiting.   Genitourinary: Negative for dysuria, flank pain, frequency and urgency.   Musculoskeletal: Negative for arthralgias, joint swelling and neck pain.   Skin: Positive for wound. Negative for color change and rash.        Laceration above R brow   Neurological: Negative for dizziness, seizures, syncope, weakness, light-headedness and headaches.       Past Medical History:   Diagnosis Date   • Asthma    • Disease of thyroid gland    • Hyperlipidemia    • Hypertension    • Macular degeneration        Allergies   Allergen Reactions   • Shellfish-Derived Products Anaphylaxis       Past Surgical History:   Procedure Laterality Date   • ADENOIDECTOMY     • DENTAL PROCEDURE     • HYSTERECTOMY      Full    • TONSILLECTOMY         No family history on file.    Social History     Socioeconomic History   • Marital status:    Tobacco Use   • Smoking status: Never Smoker   • Smokeless tobacco: Never Used   Substance and Sexual Activity   • Alcohol use: Never           Objective   Physical Exam  Constitutional:       General: She is not in acute  distress.     Appearance: Normal appearance. She is normal weight. She is not ill-appearing or toxic-appearing.   HENT:      Head:        Right Ear: External ear normal.      Left Ear: External ear normal.      Nose: Nose normal.      Mouth/Throat:      Mouth: Mucous membranes are moist.      Pharynx: Oropharynx is clear.   Eyes:      Extraocular Movements: Extraocular movements intact.      Conjunctiva/sclera: Conjunctivae normal.      Pupils: Pupils are equal, round, and reactive to light.   Cardiovascular:      Rate and Rhythm: Normal rate and regular rhythm.      Pulses: Normal pulses.      Heart sounds: Normal heart sounds.   Pulmonary:      Effort: Pulmonary effort is normal.      Breath sounds: Normal breath sounds.   Musculoskeletal:         General: No swelling, tenderness, deformity or signs of injury. Normal range of motion.      Cervical back: Normal range of motion and neck supple. No tenderness.      Right lower leg: No edema.      Left lower leg: No edema.   Lymphadenopathy:      Cervical: No cervical adenopathy.   Skin:     General: Skin is warm and dry.      Capillary Refill: Capillary refill takes less than 2 seconds.   Neurological:      Mental Status: She is alert and oriented to person, place, and time.      Cranial Nerves: No cranial nerve deficit.      Sensory: No sensory deficit.      Motor: No weakness.      Coordination: Coordination normal.      Gait: Gait normal.   Psychiatric:         Mood and Affect: Mood normal.         Behavior: Behavior normal.         Thought Content: Thought content normal.         Judgment: Judgment normal.         Laceration Repair    Date/Time: 10/16/2021 7:24 PM  Performed by: Leticia Redman APRN  Authorized by: Leticia Redman APRN     Consent:     Consent obtained:  Verbal    Consent given by:  Patient    Risks discussed:  Infection, pain and poor cosmetic result    Alternatives discussed:  No treatment  Anesthesia (see MAR for exact dosages):      Anesthesia method:  Local infiltration    Local anesthetic:  Lidocaine 2% WITH epi  Laceration details:     Location:  Face    Face location:  Forehead    Length (cm):  3  Repair type:     Repair type:  Simple  Pre-procedure details:     Preparation:  Patient was prepped and draped in usual sterile fashion  Exploration:     Hemostasis achieved with:  Epinephrine    Wound exploration: entire depth of wound probed and visualized    Treatment:     Area cleansed with:  Mian-Ricky    Amount of cleaning:  Standard    Irrigation solution:  Tap water    Irrigation method:  Tap  Skin repair:     Repair method:  Sutures    Suture size:  4-0    Suture material:  Nylon    Suture technique:  Simple interrupted    Number of sutures:  7  Approximation:     Approximation:  Close  Post-procedure details:     Dressing:  Antibiotic ointment    Patient tolerance of procedure:  Tolerated well, no immediate complications               ED Course    Medications - No data to display  Labs Reviewed - No data to display  CT Facial Bones Without Contrast   Final Result       1.  No acute facial bone fracture.   2.  Minimal chronic bilateral ethmoid and right maxillary sinusitis       Electronically Signed By-Garett Alvarado MD On:10/16/2021 6:47 PM   This report was finalized on [id number] by  Garett Alvarado MD.                                               MDM  Number of Diagnoses or Management Options  Facial laceration, initial encounter  Diagnosis management comments: I examined the patient using the appropriate personal protective equipment.      DISPOSITION:   Chart Review:  Comorbidity:  has a past medical history of Asthma, Disease of thyroid gland, Hyperlipidemia, Hypertension, and Macular degeneration.      Imaging: Was interpreted by physician and reviewed by myself:  CT Facial Bones Without Contrast    Result Date: 10/16/2021   1.  No acute facial bone fracture. 2.  Minimal chronic bilateral ethmoid and right maxillary sinusitis   Electronically Signed By-Garett Alvarado MD On:10/16/2021 6:47 PM This report was finalized on [id number] by  Garett Alvarado MD.      Disposition/Treatment:    76-year-old female presents emergency department today for evaluation of a laceration that was sustained just prior to arrival after a fall at [employer].  Patient reports she tripped over the chair in front of her and fell hitting her face.  Patient was noted to have a 3 cm irregularly-shaped laceration above the right brow and was repaired per procedure note.  CT of the facial bones were performed and was found to have no acute fracture.  Discussed findings with patient and she was given instructions for wound care.  Will be discharged home in stable condition to follow-up with her primary care provider next week for suture removal.  Patient verbalized understanding and is in agreement with plan.       Amount and/or Complexity of Data Reviewed  Tests in the radiology section of CPT®: reviewed        Final diagnoses:   Facial laceration, initial encounter       ED Disposition  ED Disposition     ED Disposition Condition Comment    Discharge Stable           Nicolle Ng, APRN  [address] [address]  [phone]      Follow up next Friday or Monday for suture removal         Medication List      No changes were made to your prescriptions during this visit.          Leticia Redman APRN  10/16/21 1928

## 2021-10-16 NOTE — DISCHARGE INSTRUCTIONS
You may use ice on the area to assist with swelling and pain.  You may use Tylenol and ibuprofen as needed for pain.  Call on Monday to schedule a follow-up either next Friday or Monday for suture removal.  Keep the laceration clean and dry, use bacitracin and a clean dressing daily.  Return to the emergency department for any new or worsening symptoms.

## 2021-10-25 ENCOUNTER — OFFICE VISIT (OUTPATIENT)
Dept: FAMILY MEDICINE CLINIC | Facility: CLINIC | Age: 76
End: 2021-10-25

## 2021-10-25 VITALS
OXYGEN SATURATION: 97 % | HEIGHT: 67 IN | HEART RATE: 65 BPM | WEIGHT: 186.6 LBS | DIASTOLIC BLOOD PRESSURE: 74 MMHG | BODY MASS INDEX: 29.29 KG/M2 | SYSTOLIC BLOOD PRESSURE: 144 MMHG

## 2021-10-25 DIAGNOSIS — S46.911A STRAIN OF RIGHT SHOULDER, INITIAL ENCOUNTER: Primary | ICD-10-CM

## 2021-10-25 DIAGNOSIS — E03.9 HYPOTHYROIDISM, UNSPECIFIED TYPE: ICD-10-CM

## 2021-10-25 DIAGNOSIS — Z48.02 VISIT FOR SUTURE REMOVAL: ICD-10-CM

## 2021-10-25 PROCEDURE — 36415 COLL VENOUS BLD VENIPUNCTURE: CPT | Performed by: NURSE PRACTITIONER

## 2021-10-25 PROCEDURE — 99213 OFFICE O/P EST LOW 20 MIN: CPT | Performed by: NURSE PRACTITIONER

## 2021-10-25 PROCEDURE — 84443 ASSAY THYROID STIM HORMONE: CPT | Performed by: NURSE PRACTITIONER

## 2021-10-25 RX ORDER — BACLOFEN 10 MG/1
10 TABLET ORAL NIGHTLY PRN
Qty: 30 TABLET | Refills: 0 | Status: SHIPPED | OUTPATIENT
Start: 2021-10-25 | End: 2021-11-16

## 2021-10-25 NOTE — PROGRESS NOTES
Chief Complaint  Suture / Staple Removal (above right eye)    Subjective          Nafisa Washington presents to [facility] for   History of Present Illness     Pt here for suture removal of sutures over the right brow, following a fall on 10/16/21 at [facility], she reports painful right lateral upper leg/hip, and posterior shoulder tender with muscle spasm. All s/s improving, has good mobility of extremities and ambulating well.   -CT facial bones wnl    Hypothyroidism, stable on medication, pt was due for TSH and will be drawn today. Denies symptoms of constipation, weight gain/loss, hot or cold intolerance, hair loss, abnormal heart rate and fatigue.       The following portions of the patient's history were reviewed and updated as appropriate: allergies, current medications, past family history, past medical history, past social history, past surgical history and problem list.    Past Medical History:   Diagnosis Date   • Asthma    • Disease of thyroid gland    • Hyperlipidemia    • Hypertension    • Macular degeneration      Past Surgical History:   Procedure Laterality Date   • ADENOIDECTOMY     • DENTAL PROCEDURE     • HYSTERECTOMY      Full    • TONSILLECTOMY       History reviewed. No pertinent family history.  Social History     Tobacco Use   • Smoking status: Never Smoker   • Smokeless tobacco: Never Used   Substance Use Topics   • Alcohol use: Never       Current Outpatient Medications:   •  albuterol sulfate  (90 Base) MCG/ACT inhaler, INHLE 2 PUFFS 4 TIMES A DAY AS NEEDED, Disp: 18 g, Rfl: 2  •  amLODIPine (NORVASC) 10 MG tablet, Take 1 tablet by mouth Daily., Disp: 90 tablet, Rfl: 1  •  atorvastatin (LIPITOR) 10 MG tablet, Take 1 tablet by mouth Daily., Disp: 90 tablet, Rfl: 1  •  cholecalciferol (VITAMIN D3) 1000 units tablet, Take 1,000 Units by mouth Daily., Disp: , Rfl:   •  Ferrous Sulfate Dried ER (SLOW IRON) 160 (50 Fe) MG tablet controlled-release, Take  by  "mouth., Disp: , Rfl:   •  fluticasone-salmeterol (Advair Diskus) 100-50 MCG/DOSE DISKUS, Inhale 1 puff 2 (Two) Times a Day., Disp: 60 each, Rfl: 5  •  fosinopril (MONOPRIL) 20 MG tablet, Take 1 tablet by mouth 2 (Two) Times a Day., Disp: 180 tablet, Rfl: 1  •  hydroCHLOROthiazide (HYDRODIURIL) 25 MG tablet, Take 1 tablet by mouth Daily., Disp: 90 tablet, Rfl: 1  •  levothyroxine (SYNTHROID, LEVOTHROID) 137 MCG tablet, Take 1 tablet by mouth Daily., Disp: 30 tablet, Rfl: 1  •  Multiple Vitamin (MULTI-VITAMIN DAILY PO), Take  by mouth., Disp: , Rfl:   •  Multiple Vitamins-Minerals (EYE VITAMINS PO), Take  by mouth., Disp: , Rfl:   •  multivitamins-minerals (PRESERVISION AREDS 2) capsule capsule, Take 1 capsule by mouth 2 (Two) Times a Day., Disp: , Rfl:   •  Naproxen Sodium (ALEVE) 220 MG capsule, Take 1 tablet by mouth 4 (Four) Times a Day., Disp: , Rfl:   •  baclofen (LIORESAL) 10 MG tablet, Take 1 tablet by mouth At Night As Needed for Muscle Spasms., Disp: 30 tablet, Rfl: 0    Objective   Vital Signs:   /74 (BP Location: Left arm, Patient Position: Sitting, Cuff Size: Large Adult)   Pulse 65   Ht 170.2 cm (67.01\")   Wt 84.6 kg (186 lb 9.6 oz)   SpO2 97%   BMI 29.22 kg/m²       Physical Exam  Vitals and nursing note reviewed.   Constitutional:       General: She is not in acute distress.     Appearance: She is well-developed. She is not diaphoretic.   Neck:      Thyroid: No thyromegaly.      Vascular: No JVD.   Musculoskeletal:         General: Tenderness (R scap tpp, firm. good rom of shoulder. R lateral upper leg with gravitating ecchymosis and hematoma, ttp. good rom of hip) present. Normal range of motion.   Skin:     General: Skin is warm and dry.      Comments: Healed lac above the lateral R brow   Neurological:      Mental Status: She is alert and oriented to person, place, and time.      Sensory: No sensory deficit.   Psychiatric:         Behavior: Behavior normal.         Thought Content: " Thought content normal.         Judgment: Judgment normal.          Result Review :     No visits with results within 7 Day(s) from this visit.   Latest known visit with results is:   Lab on 09/07/2021   Component Date Value Ref Range Status   • TSH 09/08/2021 0.033* 0.270 - 4.200 uIU/mL Final            Suture Removal    Date/Time: 10/25/2021 10:42 PM  Performed by: Nicolle Ng APRN  Authorized by: Nicolle Ng APRN   Body area: head/neck  Location details: right eyebrow  Wound Appearance: clean  Sutures Removed: 7  Post-removal: antibiotic ointment applied  Patient tolerance: patient tolerated the procedure well with no immediate complications                Assessment and Plan    Diagnoses and all orders for this visit:    1. Strain of right shoulder, initial encounter (Primary)    2. Visit for suture removal    3. Hypothyroidism, unspecified type  -     TSH    Other orders  -     baclofen (LIORESAL) 10 MG tablet; Take 1 tablet by mouth At Night As Needed for Muscle Spasms.  Dispense: 30 tablet; Refill: 0  -     Suture Removal    -try baclofen at night prn for shoulder pain/spasm, ice and rest.   -Sutures removed and lester well. Keep clean, apply ABO, bandaid for 2-3 days then open to air.   -will notify results of TSH, adjust levo prn    I spent 25 minutes caring for Nafisa Washington on this date of service. This time includes time spent by me in the following activities: preparing for the visit, reviewing tests, performing a medically appropriate examination and/or evaluation , counseling and educating the patient/family/caregiver, ordering medications, tests, or procedures and documenting information in the medical record        Follow Up     Return if symptoms worsen or fail to improve, for Next scheduled follow up.  Patient was given instructions and counseling regarding her condition or for health maintenance advice. Please see specific information pulled into the AVS if appropriate.      EMR Dragon  transcription disclaimer:  Some of this encounter note is an electronic transcription translation of spoken language to printed text. The electronic translation of spoken language may permit erroneous, or at times, nonsensical words or phrases to be inadvertently transcribed; Although I have reviewed the note for such errors some may still exist.

## 2021-10-25 NOTE — PROGRESS NOTES
Venipuncture Blood Specimen Collection  Venipuncture performed in right arm by Althea Garcia MA with good hemostasis. Patient tolerated the procedure well without complications.   10/25/21   Althea Garcia MA

## 2021-10-26 LAB — TSH SERPL DL<=0.05 MIU/L-ACNC: 0.03 UIU/ML (ref 0.27–4.2)

## 2021-10-29 RX ORDER — LEVOTHYROXINE SODIUM 0.12 MG/1
TABLET ORAL
Qty: 40 TABLET | Refills: 1 | Status: SHIPPED | OUTPATIENT
Start: 2021-10-29 | End: 2022-01-25

## 2021-11-03 DIAGNOSIS — E03.9 ACQUIRED HYPOTHYROIDISM: ICD-10-CM

## 2021-11-03 RX ORDER — LEVOTHYROXINE SODIUM 137 UG/1
TABLET ORAL
Qty: 30 TABLET | Refills: 1 | Status: SHIPPED | OUTPATIENT
Start: 2021-11-03 | End: 2022-01-03

## 2021-11-16 RX ORDER — BACLOFEN 10 MG/1
10 TABLET ORAL NIGHTLY PRN
Qty: 30 TABLET | Refills: 2 | Status: SHIPPED | OUTPATIENT
Start: 2021-11-16 | End: 2022-01-26

## 2021-12-29 DIAGNOSIS — E78.2 MIXED HYPERLIPIDEMIA: ICD-10-CM

## 2021-12-29 DIAGNOSIS — I10 ESSENTIAL HYPERTENSION: ICD-10-CM

## 2021-12-29 DIAGNOSIS — E03.9 ACQUIRED HYPOTHYROIDISM: ICD-10-CM

## 2022-01-03 RX ORDER — HYDROCHLOROTHIAZIDE 25 MG/1
TABLET ORAL
Qty: 90 TABLET | Refills: 1 | Status: SHIPPED | OUTPATIENT
Start: 2022-01-03 | End: 2022-08-24 | Stop reason: SDUPTHER

## 2022-01-03 RX ORDER — LEVOTHYROXINE SODIUM 137 UG/1
TABLET ORAL
Qty: 30 TABLET | Refills: 1 | Status: SHIPPED | OUTPATIENT
Start: 2022-01-03 | End: 2022-01-26

## 2022-01-03 RX ORDER — ATORVASTATIN CALCIUM 10 MG/1
TABLET, FILM COATED ORAL
Qty: 90 TABLET | Refills: 1 | Status: SHIPPED | OUTPATIENT
Start: 2022-01-03 | End: 2022-08-24 | Stop reason: SDUPTHER

## 2022-01-07 RX ORDER — FOSINOPRIL SODIUM 20 MG/1
TABLET ORAL
Qty: 180 TABLET | Refills: 1 | Status: SHIPPED | OUTPATIENT
Start: 2022-01-07 | End: 2022-01-26 | Stop reason: SDUPTHER

## 2022-01-19 ENCOUNTER — LAB (OUTPATIENT)
Dept: FAMILY MEDICINE CLINIC | Facility: CLINIC | Age: 77
End: 2022-01-19

## 2022-01-19 DIAGNOSIS — I10 HYPERTENSION, UNSPECIFIED TYPE: Primary | ICD-10-CM

## 2022-01-19 DIAGNOSIS — E03.9 HYPOTHYROIDISM, UNSPECIFIED TYPE: ICD-10-CM

## 2022-01-19 DIAGNOSIS — E78.2 MIXED HYPERLIPIDEMIA: ICD-10-CM

## 2022-01-19 PROCEDURE — 80053 COMPREHEN METABOLIC PANEL: CPT | Performed by: NURSE PRACTITIONER

## 2022-01-19 PROCEDURE — 80061 LIPID PANEL: CPT | Performed by: NURSE PRACTITIONER

## 2022-01-19 PROCEDURE — 85027 COMPLETE CBC AUTOMATED: CPT | Performed by: NURSE PRACTITIONER

## 2022-01-19 PROCEDURE — 84443 ASSAY THYROID STIM HORMONE: CPT | Performed by: NURSE PRACTITIONER

## 2022-01-19 PROCEDURE — 36415 COLL VENOUS BLD VENIPUNCTURE: CPT | Performed by: NURSE PRACTITIONER

## 2022-01-20 LAB
ALBUMIN SERPL-MCNC: 4.4 G/DL (ref 3.5–5.2)
ALBUMIN/GLOB SERPL: 2 G/DL
ALP SERPL-CCNC: 67 U/L (ref 39–117)
ALT SERPL W P-5'-P-CCNC: 17 U/L (ref 1–33)
ANION GAP SERPL CALCULATED.3IONS-SCNC: 11.9 MMOL/L (ref 5–15)
AST SERPL-CCNC: 24 U/L (ref 1–32)
BILIRUB SERPL-MCNC: 0.5 MG/DL (ref 0–1.2)
BUN SERPL-MCNC: 11 MG/DL (ref 8–23)
BUN/CREAT SERPL: 20 (ref 7–25)
CALCIUM SPEC-SCNC: 9.6 MG/DL (ref 8.6–10.5)
CHLORIDE SERPL-SCNC: 94 MMOL/L (ref 98–107)
CHOLEST SERPL-MCNC: 172 MG/DL (ref 0–200)
CO2 SERPL-SCNC: 27.1 MMOL/L (ref 22–29)
CREAT SERPL-MCNC: 0.55 MG/DL (ref 0.57–1)
DEPRECATED RDW RBC AUTO: 43.4 FL (ref 37–54)
ERYTHROCYTE [DISTWIDTH] IN BLOOD BY AUTOMATED COUNT: 13.5 % (ref 12.3–15.4)
GFR SERPL CREATININE-BSD FRML MDRD: 108 ML/MIN/1.73
GLOBULIN UR ELPH-MCNC: 2.2 GM/DL
GLUCOSE SERPL-MCNC: 100 MG/DL (ref 65–99)
HCT VFR BLD AUTO: 41.1 % (ref 34–46.6)
HDLC SERPL-MCNC: 71 MG/DL (ref 40–60)
HGB BLD-MCNC: 13.3 G/DL (ref 12–15.9)
LDLC SERPL CALC-MCNC: 89 MG/DL (ref 0–100)
LDLC/HDLC SERPL: 1.25 {RATIO}
MCH RBC QN AUTO: 28.7 PG (ref 26.6–33)
MCHC RBC AUTO-ENTMCNC: 32.4 G/DL (ref 31.5–35.7)
MCV RBC AUTO: 88.6 FL (ref 79–97)
PLATELET # BLD AUTO: 270 10*3/MM3 (ref 140–450)
PMV BLD AUTO: 10.9 FL (ref 6–12)
POTASSIUM SERPL-SCNC: 4 MMOL/L (ref 3.5–5.2)
PROT SERPL-MCNC: 6.6 G/DL (ref 6–8.5)
RBC # BLD AUTO: 4.64 10*6/MM3 (ref 3.77–5.28)
SODIUM SERPL-SCNC: 133 MMOL/L (ref 136–145)
TRIGL SERPL-MCNC: 61 MG/DL (ref 0–150)
TSH SERPL DL<=0.05 MIU/L-ACNC: 0.02 UIU/ML (ref 0.27–4.2)
VLDLC SERPL-MCNC: 12 MG/DL (ref 5–40)
WBC NRBC COR # BLD: 5.2 10*3/MM3 (ref 3.4–10.8)

## 2022-01-25 DIAGNOSIS — I10 ESSENTIAL HYPERTENSION: ICD-10-CM

## 2022-01-25 RX ORDER — LEVOTHYROXINE SODIUM 0.12 MG/1
TABLET ORAL
Qty: 90 TABLET | Refills: 0 | Status: SHIPPED | OUTPATIENT
Start: 2022-01-25 | End: 2022-01-26 | Stop reason: SDUPTHER

## 2022-01-25 RX ORDER — AMLODIPINE BESYLATE 10 MG/1
TABLET ORAL
Qty: 90 TABLET | Refills: 1 | Status: SHIPPED | OUTPATIENT
Start: 2022-01-25 | End: 2022-08-24 | Stop reason: SDUPTHER

## 2022-01-26 ENCOUNTER — OFFICE VISIT (OUTPATIENT)
Dept: FAMILY MEDICINE CLINIC | Facility: CLINIC | Age: 77
End: 2022-01-26

## 2022-01-26 VITALS
OXYGEN SATURATION: 99 % | HEIGHT: 67 IN | SYSTOLIC BLOOD PRESSURE: 114 MMHG | HEART RATE: 49 BPM | DIASTOLIC BLOOD PRESSURE: 69 MMHG | WEIGHT: 189 LBS | BODY MASS INDEX: 29.66 KG/M2

## 2022-01-26 DIAGNOSIS — I10 ESSENTIAL HYPERTENSION: Primary | ICD-10-CM

## 2022-01-26 DIAGNOSIS — J45.40 MODERATE PERSISTENT ASTHMA WITHOUT COMPLICATION: ICD-10-CM

## 2022-01-26 DIAGNOSIS — E03.9 ACQUIRED HYPOTHYROIDISM: ICD-10-CM

## 2022-01-26 DIAGNOSIS — Z12.11 COLON CANCER SCREENING: ICD-10-CM

## 2022-01-26 DIAGNOSIS — E78.2 MIXED HYPERLIPIDEMIA: ICD-10-CM

## 2022-01-26 PROCEDURE — 99214 OFFICE O/P EST MOD 30 MIN: CPT | Performed by: NURSE PRACTITIONER

## 2022-01-26 RX ORDER — LEVOTHYROXINE SODIUM 0.12 MG/1
TABLET ORAL
Qty: 90 TABLET | Refills: 1 | Status: SHIPPED | OUTPATIENT
Start: 2022-01-26 | End: 2022-03-10 | Stop reason: SDUPTHER

## 2022-01-26 RX ORDER — SULFASALAZINE 500 MG/1
1 TABLET ORAL 2 TIMES DAILY
COMMUNITY
Start: 2022-01-20

## 2022-01-26 RX ORDER — FOSINOPRIL SODIUM 20 MG/1
20 TABLET ORAL 2 TIMES DAILY
Qty: 180 TABLET | Refills: 1 | Status: SHIPPED | OUTPATIENT
Start: 2022-01-26 | End: 2022-08-24 | Stop reason: SDUPTHER

## 2022-01-26 NOTE — PROGRESS NOTES
Chief Complaint  Hypertension, Hyperlipidemia, Hypothyroidism, Follow-up (6 mo), and Results (1/19/22 labs)    Subjective          Nafisa Washington presents to [facility] for   History of Present Illness     HTN, stable on meds and takes as directed, denies chest pain, headache, shortness of air, palpitations and swelling of extremities.     Hyperlipidemia, The patient denies muscle aches, constipation, diarrhea, GI upset, fatigue, chest pain/pressure, exercise intolerance, dyspnea, palpitations, syncope and pedal edema.      Hypothyroidism, fluctuating over the last 1 year with multiple dose changes and alternating dosing.  Here to review labs today.  Patient reports significant fatigue, denies symptoms of constipation, weight gain/loss, hot or cold intolerance, hair loss, abnormal heart rate.     Colonoscopy overdue, last done in [place]      The following portions of the patient's history were reviewed and updated as appropriate: allergies, current medications, past family history, past medical history, past social history, past surgical history and problem list.    Past Medical History:   Diagnosis Date   • Asthma    • Disease of thyroid gland    • Hyperlipidemia    • Hypertension    • Macular degeneration      Past Surgical History:   Procedure Laterality Date   • ADENOIDECTOMY     • DENTAL PROCEDURE     • HYSTERECTOMY      Full    • TONSILLECTOMY       History reviewed. No pertinent family history.  Social History     Tobacco Use   • Smoking status: Never Smoker   • Smokeless tobacco: Never Used   Substance Use Topics   • Alcohol use: Never       Current Outpatient Medications:   •  albuterol sulfate  (90 Base) MCG/ACT inhaler, INHLE 2 PUFFS 4 TIMES A DAY AS NEEDED, Disp: 18 g, Rfl: 2  •  amLODIPine (NORVASC) 10 MG tablet, TAKE 1 TABLET BY MOUTH EVERY DAY, Disp: 90 tablet, Rfl: 1  •  atorvastatin (LIPITOR) 10 MG tablet, TAKE 1 TABLET BY MOUTH EVERY DAY, Disp: 90  "tablet, Rfl: 1  •  cholecalciferol (VITAMIN D3) 1000 units tablet, Take 1,000 Units by mouth Daily., Disp: , Rfl:   •  Ferrous Sulfate Dried ER (SLOW IRON) 160 (50 Fe) MG tablet controlled-release, Take  by mouth., Disp: , Rfl:   •  fluticasone-salmeterol (Advair Diskus) 100-50 MCG/DOSE DISKUS, Inhale 1 puff 2 (Two) Times a Day., Disp: 60 each, Rfl: 5  •  fosinopril (MONOPRIL) 20 MG tablet, Take 1 tablet by mouth 2 (Two) Times a Day., Disp: 180 tablet, Rfl: 1  •  hydroCHLOROthiazide (HYDRODIURIL) 25 MG tablet, TAKE 1 TABLET BY MOUTH EVERY DAY, Disp: 90 tablet, Rfl: 1  •  levothyroxine (SYNTHROID, LEVOTHROID) 125 MCG tablet, TAKE 1 TABLET BY MOUTH EVERY DAY, Disp: 90 tablet, Rfl: 1  •  Multiple Vitamin (MULTI-VITAMIN DAILY PO), Take  by mouth., Disp: , Rfl:   •  multivitamins-minerals (PRESERVISION AREDS 2) capsule capsule, Take 1 capsule by mouth 2 (Two) Times a Day., Disp: , Rfl:   •  Naproxen Sodium (ALEVE) 220 MG capsule, Take 1 tablet by mouth 4 (Four) Times a Day., Disp: , Rfl:   •  sulfaSALAzine (AZULFIDINE) 500 MG tablet, Take 1 tablet by mouth 2 (Two) Times a Day., Disp: , Rfl:     Objective   Vital Signs:   /69 (BP Location: Right arm, Patient Position: Sitting, Cuff Size: Large Adult)   Pulse (!) 49   Ht 170.2 cm (67.01\")   Wt 85.7 kg (189 lb)   SpO2 99%   BMI 29.59 kg/m²       Physical Exam  Vitals and nursing note reviewed.   Constitutional:       General: She is not in acute distress.     Appearance: She is well-developed. She is not diaphoretic.   Eyes:      Pupils: Pupils are equal, round, and reactive to light.   Neck:      Thyroid: No thyromegaly.      Vascular: No JVD.   Cardiovascular:      Rate and Rhythm: Normal rate and regular rhythm.      Heart sounds: Normal heart sounds. No murmur heard.      Pulmonary:      Effort: Pulmonary effort is normal. No respiratory distress.      Breath sounds: Normal breath sounds. No wheezing.   Abdominal:      General: Bowel sounds are normal. There " is no distension.      Palpations: Abdomen is soft.      Tenderness: There is no abdominal tenderness.   Musculoskeletal:         General: No swelling or tenderness. Normal range of motion.      Cervical back: Normal range of motion and neck supple.   Skin:     General: Skin is warm and dry.   Neurological:      Mental Status: She is alert and oriented to person, place, and time.      Sensory: No sensory deficit.   Psychiatric:         Behavior: Behavior normal.         Thought Content: Thought content normal.         Judgment: Judgment normal.          Result Review :     No visits with results within 7 Day(s) from this visit.   Latest known visit with results is:   Lab on 01/19/2022   Component Date Value Ref Range Status   • WBC 01/19/2022 5.20  3.40 - 10.80 10*3/mm3 Final   • RBC 01/19/2022 4.64  3.77 - 5.28 10*6/mm3 Final   • Hemoglobin 01/19/2022 13.3  12.0 - 15.9 g/dL Final   • Hematocrit 01/19/2022 41.1  34.0 - 46.6 % Final   • MCV 01/19/2022 88.6  79.0 - 97.0 fL Final   • MCH 01/19/2022 28.7  26.6 - 33.0 pg Final   • MCHC 01/19/2022 32.4  31.5 - 35.7 g/dL Final   • RDW 01/19/2022 13.5  12.3 - 15.4 % Final   • RDW-SD 01/19/2022 43.4  37.0 - 54.0 fl Final   • MPV 01/19/2022 10.9  6.0 - 12.0 fL Final   • Platelets 01/19/2022 270  140 - 450 10*3/mm3 Final   • Glucose 01/19/2022 100* 65 - 99 mg/dL Final   • BUN 01/19/2022 11  8 - 23 mg/dL Final   • Creatinine 01/19/2022 0.55* 0.57 - 1.00 mg/dL Final   • Sodium 01/19/2022 133* 136 - 145 mmol/L Final   • Potassium 01/19/2022 4.0  3.5 - 5.2 mmol/L Final   • Chloride 01/19/2022 94* 98 - 107 mmol/L Final   • CO2 01/19/2022 27.1  22.0 - 29.0 mmol/L Final   • Calcium 01/19/2022 9.6  8.6 - 10.5 mg/dL Final   • Total Protein 01/19/2022 6.6  6.0 - 8.5 g/dL Final   • Albumin 01/19/2022 4.40  3.50 - 5.20 g/dL Final   • ALT (SGPT) 01/19/2022 17  1 - 33 U/L Final   • AST (SGOT) 01/19/2022 24  1 - 32 U/L Final   • Alkaline Phosphatase 01/19/2022 67  39 - 117 U/L Final   •  Total Bilirubin 01/19/2022 0.5  0.0 - 1.2 mg/dL Final   • eGFR Non  Amer 01/19/2022 108  >60 mL/min/1.73 Final   • Globulin 01/19/2022 2.2  gm/dL Final   • A/G Ratio 01/19/2022 2.0  g/dL Final   • BUN/Creatinine Ratio 01/19/2022 20.0  7.0 - 25.0 Final   • Anion Gap 01/19/2022 11.9  5.0 - 15.0 mmol/L Final   • Total Cholesterol 01/19/2022 172  0 - 200 mg/dL Final   • Triglycerides 01/19/2022 61  0 - 150 mg/dL Final   • HDL Cholesterol 01/19/2022 71* 40 - 60 mg/dL Final   • LDL Cholesterol  01/19/2022 89  0 - 100 mg/dL Final   • VLDL Cholesterol 01/19/2022 12  5 - 40 mg/dL Final   • LDL/HDL Ratio 01/19/2022 1.25   Final   • TSH 01/19/2022 0.016* 0.270 - 4.200 uIU/mL Final                       Assessment and Plan    Diagnoses and all orders for this visit:    1. Essential hypertension (Primary)    2. Moderate persistent asthma without complication  -     fluticasone-salmeterol (Advair Diskus) 100-50 MCG/DOSE DISKUS; Inhale 1 puff 2 (Two) Times a Day.  Dispense: 60 each; Refill: 5    3. Acquired hypothyroidism    4. Mixed hyperlipidemia    5. Colon cancer screening  -     Ambulatory Referral to Gastroenterology    Other orders  -     levothyroxine (SYNTHROID, LEVOTHROID) 125 MCG tablet; TAKE 1 TABLET BY MOUTH EVERY DAY  Dispense: 90 tablet; Refill: 1  -     fosinopril (MONOPRIL) 20 MG tablet; Take 1 tablet by mouth 2 (Two) Times a Day.  Dispense: 180 tablet; Refill: 1      -Conditions mostly stable  -Thyroid hyperactive again, d/c alternating doses of 137 and 125, resume 125 levothyroxine daily.  Recheck TSH in 6 weeks rf meds as above  -Otherwise all other labs reviewed and stable  -For fatigue recommend patient continue vitamin D and try B complex  -Referral for colonoscopy  -Mammogram up-to-date    I spent 30 minutes caring for Nafisa Washington on this date of service. This time includes time spent by me in the following activities: preparing for the visit, reviewing tests, performing a medically  appropriate examination and/or evaluation , counseling and educating the patient/family/caregiver, ordering medications, tests, or procedures and documenting information in the medical record        Follow Up     Return in about 6 months (around 7/26/2022) for Recheck HTN, lipids, Medicare Wellness. TSH in 6 wks. .  Patient was given instructions and counseling regarding her condition or for health maintenance advice. Please see specific information pulled into the AVS if appropriate.      EMR Dragon transcription disclaimer:  Some of this encounter note is an electronic transcription translation of spoken language to printed text. The electronic translation of spoken language may permit erroneous, or at times, nonsensical words or phrases to be inadvertently transcribed; Although I have reviewed the note for such errors some may still exist.

## 2022-01-30 DIAGNOSIS — E03.9 ACQUIRED HYPOTHYROIDISM: ICD-10-CM

## 2022-01-31 RX ORDER — LEVOTHYROXINE SODIUM 137 UG/1
TABLET ORAL
Qty: 30 TABLET | Refills: 1 | OUTPATIENT
Start: 2022-01-31

## 2022-03-09 ENCOUNTER — CLINICAL SUPPORT (OUTPATIENT)
Dept: FAMILY MEDICINE CLINIC | Facility: CLINIC | Age: 77
End: 2022-03-09

## 2022-03-09 DIAGNOSIS — E03.9 ACQUIRED HYPOTHYROIDISM: Primary | ICD-10-CM

## 2022-03-09 PROCEDURE — 36415 COLL VENOUS BLD VENIPUNCTURE: CPT | Performed by: NURSE PRACTITIONER

## 2022-03-09 PROCEDURE — 84443 ASSAY THYROID STIM HORMONE: CPT | Performed by: NURSE PRACTITIONER

## 2022-03-09 NOTE — PROGRESS NOTES
Venipuncture Blood Specimen Collection  Venipuncture performed in lt arm by Althea Garcia MA with good hemostasis. Patient tolerated the procedure well without complications.   03/09/22   Althea Garcia MA

## 2022-03-10 LAB — TSH SERPL DL<=0.05 MIU/L-ACNC: 0.05 UIU/ML (ref 0.27–4.2)

## 2022-03-10 RX ORDER — LEVOTHYROXINE SODIUM 0.1 MG/1
TABLET ORAL
Qty: 90 TABLET | Refills: 0 | Status: SHIPPED | OUTPATIENT
Start: 2022-03-10 | End: 2022-04-25

## 2022-03-28 RX ORDER — ALBUTEROL SULFATE 90 UG/1
AEROSOL, METERED RESPIRATORY (INHALATION)
Qty: 18 G | Refills: 2 | Status: SHIPPED | OUTPATIENT
Start: 2022-03-28 | End: 2022-06-10

## 2022-04-20 ENCOUNTER — LAB (OUTPATIENT)
Dept: FAMILY MEDICINE CLINIC | Facility: CLINIC | Age: 77
End: 2022-04-20

## 2022-04-20 DIAGNOSIS — E03.9 ACQUIRED HYPOTHYROIDISM: Primary | ICD-10-CM

## 2022-04-20 PROCEDURE — 84443 ASSAY THYROID STIM HORMONE: CPT | Performed by: NURSE PRACTITIONER

## 2022-04-20 PROCEDURE — 36415 COLL VENOUS BLD VENIPUNCTURE: CPT | Performed by: NURSE PRACTITIONER

## 2022-04-21 LAB — TSH SERPL DL<=0.05 MIU/L-ACNC: 1.12 UIU/ML (ref 0.27–4.2)

## 2022-04-25 RX ORDER — LEVOTHYROXINE SODIUM 0.1 MG/1
TABLET ORAL
Qty: 90 TABLET | Refills: 0 | Status: SHIPPED | OUTPATIENT
Start: 2022-04-25 | End: 2022-08-24 | Stop reason: SDUPTHER

## 2022-06-10 RX ORDER — ALBUTEROL SULFATE 90 UG/1
AEROSOL, METERED RESPIRATORY (INHALATION)
Qty: 18 G | Refills: 2 | Status: SHIPPED | OUTPATIENT
Start: 2022-06-10 | End: 2022-09-12

## 2022-08-24 ENCOUNTER — OFFICE VISIT (OUTPATIENT)
Dept: FAMILY MEDICINE CLINIC | Facility: CLINIC | Age: 77
End: 2022-08-24

## 2022-08-24 VITALS
WEIGHT: 189 LBS | TEMPERATURE: 97.8 F | HEART RATE: 71 BPM | RESPIRATION RATE: 20 BRPM | SYSTOLIC BLOOD PRESSURE: 148 MMHG | HEIGHT: 66 IN | BODY MASS INDEX: 30.37 KG/M2 | OXYGEN SATURATION: 100 % | DIASTOLIC BLOOD PRESSURE: 68 MMHG

## 2022-08-24 DIAGNOSIS — E78.2 MIXED HYPERLIPIDEMIA: ICD-10-CM

## 2022-08-24 DIAGNOSIS — M17.0 PRIMARY OSTEOARTHRITIS OF BOTH KNEES: ICD-10-CM

## 2022-08-24 DIAGNOSIS — E03.9 ACQUIRED HYPOTHYROIDISM: ICD-10-CM

## 2022-08-24 DIAGNOSIS — I10 ESSENTIAL HYPERTENSION: ICD-10-CM

## 2022-08-24 DIAGNOSIS — J45.40 MODERATE PERSISTENT ASTHMA WITHOUT COMPLICATION: ICD-10-CM

## 2022-08-24 DIAGNOSIS — Z12.11 COLON CANCER SCREENING: ICD-10-CM

## 2022-08-24 DIAGNOSIS — Z00.00 MEDICARE ANNUAL WELLNESS VISIT, SUBSEQUENT: Primary | ICD-10-CM

## 2022-08-24 DIAGNOSIS — M15.4 EROSIVE OSTEOARTHRITIS OF MULTIPLE SITES: ICD-10-CM

## 2022-08-24 DIAGNOSIS — Z12.31 BREAST CANCER SCREENING BY MAMMOGRAM: ICD-10-CM

## 2022-08-24 PROCEDURE — 1159F MED LIST DOCD IN RCRD: CPT | Performed by: NURSE PRACTITIONER

## 2022-08-24 PROCEDURE — G0439 PPPS, SUBSEQ VISIT: HCPCS | Performed by: NURSE PRACTITIONER

## 2022-08-24 PROCEDURE — 80053 COMPREHEN METABOLIC PANEL: CPT | Performed by: NURSE PRACTITIONER

## 2022-08-24 PROCEDURE — 84443 ASSAY THYROID STIM HORMONE: CPT | Performed by: NURSE PRACTITIONER

## 2022-08-24 PROCEDURE — 80061 LIPID PANEL: CPT | Performed by: NURSE PRACTITIONER

## 2022-08-24 PROCEDURE — 1170F FXNL STATUS ASSESSED: CPT | Performed by: NURSE PRACTITIONER

## 2022-08-24 PROCEDURE — 99213 OFFICE O/P EST LOW 20 MIN: CPT | Performed by: NURSE PRACTITIONER

## 2022-08-24 PROCEDURE — 85027 COMPLETE CBC AUTOMATED: CPT | Performed by: NURSE PRACTITIONER

## 2022-08-24 RX ORDER — ZOLEDRONIC ACID 5 MG/100ML
100 INJECTION, SOLUTION INTRAVENOUS ONCE
COMMUNITY
Start: 2022-08-16

## 2022-08-24 RX ORDER — AMLODIPINE BESYLATE 10 MG/1
10 TABLET ORAL DAILY
Qty: 90 TABLET | Refills: 1 | Status: SHIPPED | OUTPATIENT
Start: 2022-08-24 | End: 2023-01-03

## 2022-08-24 RX ORDER — FOSINOPRIL SODIUM 20 MG/1
20 TABLET ORAL 2 TIMES DAILY
Qty: 180 TABLET | Refills: 1 | Status: SHIPPED | OUTPATIENT
Start: 2022-08-24 | End: 2023-02-27 | Stop reason: SDUPTHER

## 2022-08-24 RX ORDER — HYDROCHLOROTHIAZIDE 25 MG/1
25 TABLET ORAL DAILY
Qty: 90 TABLET | Refills: 1 | Status: SHIPPED | OUTPATIENT
Start: 2022-08-24 | End: 2023-01-03

## 2022-08-24 RX ORDER — ATORVASTATIN CALCIUM 10 MG/1
10 TABLET, FILM COATED ORAL DAILY
Qty: 90 TABLET | Refills: 1 | Status: SHIPPED | OUTPATIENT
Start: 2022-08-24 | End: 2023-01-03

## 2022-08-24 RX ORDER — LEVOTHYROXINE SODIUM 0.1 MG/1
100 TABLET ORAL DAILY
Qty: 90 TABLET | Refills: 1 | Status: SHIPPED | OUTPATIENT
Start: 2022-08-24 | End: 2023-01-03

## 2022-08-24 NOTE — PROGRESS NOTES
"QUICK REFERENCE INFORMATION:  The ABCs of the Annual Wellness Visit    Subsequent Medicare Wellness Visit    HEALTH RISK ASSESSMENT    1945    Recent Hospitalizations:  {Hospitalization history:2863378367::\"No hospitalization(s) within the last year.\"}.        Current Medical Providers:  Patient Care Team:  Nicolle Ng APRN as PCP - General (Nurse Practitioner)  Deion Garcia Jr., MD        Smoking Status:  Social History     Tobacco Use   Smoking Status Never Smoker   Smokeless Tobacco Never Used       Alcohol Consumption:  Social History     Substance and Sexual Activity   Alcohol Use Never       Depression Screen:   PHQ-2/PHQ-9 Depression Screening 1/26/2022   Retired PHQ-9 Total Score 3   Retired Total Score 3       Health Habits and Functional and Cognitive Screening:  No flowsheet data found.        Does the patient have evidence of cognitive impairment? {Yes/No w/ pre-defaulted No:10805::\"No\"}    Aspirin use counseling: {Aspirin :83930}      Recent Lab Results:  CMP:  Lab Results   Component Value Date    BUN 11 01/19/2022    CREATININE 0.55 (L) 01/19/2022    EGFRIFNONA 108 01/19/2022    BCR 20.0 01/19/2022     (L) 01/19/2022    K 4.0 01/19/2022    CO2 27.1 01/19/2022    CALCIUM 9.6 01/19/2022    ALBUMIN 4.40 01/19/2022    LABIL2 1.5 09/10/2018    BILITOT 0.5 01/19/2022    ALKPHOS 67 01/19/2022    AST 24 01/19/2022    ALT 17 01/19/2022     Lipid Panel:  Lab Results   Component Value Date    CHOL 172 01/19/2022    TRIG 61 01/19/2022    HDL 71 (H) 01/19/2022    VLDL 12 01/19/2022    LDLHDL 1.25 01/19/2022     HbA1c:  Lab Results   Component Value Date    HGBA1C 5.5 07/22/2019       Visual Acuity:  No exam data present    Age-appropriate Screening Schedule:  Refer to the list below for future screening recommendations based on patient's age, sex and/or medical conditions. Orders for these recommended tests are listed in the plan section. The patient has been provided with a written " "plan.    Health Maintenance   Topic Date Due   • ZOSTER VACCINE (1 of 2) Never done   • DXA SCAN  02/27/2022   • INFLUENZA VACCINE  10/01/2022   • LIPID PANEL  01/19/2023   • TDAP/TD VACCINES (2 - Tdap) 10/27/2029        Subjective   History of Present Illness    Nafisa Washington is a 76 y.o. female who presents for an Subsequent Wellness Visit.    The following portions of the patient's history were reviewed and updated as appropriate: {history reviewed:20406::\"allergies\",\"current medications\",\"past family history\",\"past medical history\",\"past social history\",\"past surgical history\",\"problem list\"}.    Outpatient Medications Prior to Visit   Medication Sig Dispense Refill   • albuterol sulfate  (90 Base) MCG/ACT inhaler INHLE 2 PUFFS 4 TIMES A DAY AS NEEDED 18 g 2   • amLODIPine (NORVASC) 10 MG tablet TAKE 1 TABLET BY MOUTH EVERY DAY 90 tablet 1   • atorvastatin (LIPITOR) 10 MG tablet TAKE 1 TABLET BY MOUTH EVERY DAY 90 tablet 1   • cholecalciferol (VITAMIN D3) 1000 units tablet Take 1,000 Units by mouth Daily.     • Ferrous Sulfate Dried  (50 Fe) MG tablet controlled-release Take  by mouth.     • fluticasone-salmeterol (Advair Diskus) 100-50 MCG/DOSE DISKUS Inhale 1 puff 2 (Two) Times a Day. 60 each 5   • fosinopril (MONOPRIL) 20 MG tablet Take 1 tablet by mouth 2 (Two) Times a Day. 180 tablet 1   • hydroCHLOROthiazide (HYDRODIURIL) 25 MG tablet TAKE 1 TABLET BY MOUTH EVERY DAY 90 tablet 1   • levothyroxine (SYNTHROID, LEVOTHROID) 100 MCG tablet TAKE 1 TABLET BY MOUTH EVERY DAY 90 tablet 0   • Multiple Vitamin (MULTI-VITAMIN DAILY PO) Take  by mouth.     • multivitamins-minerals (PRESERVISION AREDS 2) capsule capsule Take 1 capsule by mouth 2 (Two) Times a Day.     • sulfamethoxazole-trimethoprim (BACTRIM DS,SEPTRA DS) 800-160 MG per tablet Take 1 tablet by mouth 2 (Two) Times a Day. 20 tablet 0   • sulfaSALAzine (AZULFIDINE) 500 MG tablet Take 1 tablet by mouth 2 (Two) Times a Day.       No " facility-administered medications prior to visit.       Patient Active Problem List   Diagnosis   • Asthma with COPD (HCC)   • Encounter for general adult medical examination without abnormal findings   • Family history of malignant neoplasm of breast   • Fibrocystic breast changes   • Hypertension   • Hypothyroidism   • Macular degeneration       Advance Care Planning:  {Advance Directive Status:22329}    Identification of Risk Factors:  Risk factors include: {MC; WELLNESS RISK FACTORS:42635}.    Review of Systems    Compared to one year ago, the patient feels her physical health is worse.  Compared to one year ago, the patient feels her mental health is {better worse same:89565}.    Objective     Physical Exam    There were no vitals filed for this visit.    {BMI is >= 25 and <30. (Overweight) The following options were offered after discussion;:[id number]}      Assessment & Plan   Patient Self-Management and Personalized Health Advice  The patient has been provided with information about: {; PERSONALIZED HEALTH ADVICE:49599} and preventive services including:   · {plan:05391}.    Visit Diagnoses:  No diagnosis found.    No orders of the defined types were placed in this encounter.      Outpatient Encounter Medications as of 8/24/2022   Medication Sig Dispense Refill   • albuterol sulfate  (90 Base) MCG/ACT inhaler INHLE 2 PUFFS 4 TIMES A DAY AS NEEDED 18 g 2   • amLODIPine (NORVASC) 10 MG tablet TAKE 1 TABLET BY MOUTH EVERY DAY 90 tablet 1   • atorvastatin (LIPITOR) 10 MG tablet TAKE 1 TABLET BY MOUTH EVERY DAY 90 tablet 1   • cholecalciferol (VITAMIN D3) 1000 units tablet Take 1,000 Units by mouth Daily.     • Ferrous Sulfate Dried  (50 Fe) MG tablet controlled-release Take  by mouth.     • fluticasone-salmeterol (Advair Diskus) 100-50 MCG/DOSE DISKUS Inhale 1 puff 2 (Two) Times a Day. 60 each 5   • fosinopril (MONOPRIL) 20 MG tablet Take 1 tablet by mouth 2 (Two) Times a Day. 180 tablet 1   •  hydroCHLOROthiazide (HYDRODIURIL) 25 MG tablet TAKE 1 TABLET BY MOUTH EVERY DAY 90 tablet 1   • levothyroxine (SYNTHROID, LEVOTHROID) 100 MCG tablet TAKE 1 TABLET BY MOUTH EVERY DAY 90 tablet 0   • Multiple Vitamin (MULTI-VITAMIN DAILY PO) Take  by mouth.     • multivitamins-minerals (PRESERVISION AREDS 2) capsule capsule Take 1 capsule by mouth 2 (Two) Times a Day.     • sulfamethoxazole-trimethoprim (BACTRIM DS,SEPTRA DS) 800-160 MG per tablet Take 1 tablet by mouth 2 (Two) Times a Day. 20 tablet 0   • sulfaSALAzine (AZULFIDINE) 500 MG tablet Take 1 tablet by mouth 2 (Two) Times a Day.       No facility-administered encounter medications on file as of 8/24/2022.       Reviewed use of high risk medication in the elderly: {Response; yes/no/na:63}  Reviewed for potential of harmful drug interactions in the elderly: {Response; yes/no/na:63}    Follow Up:  No follow-ups on file.     An After Visit Summary and PPPS with all of these plans were given to the patient.

## 2022-08-24 NOTE — PROGRESS NOTES
The ABCs of the Annual Wellness Visit  Subsequent Medicare Wellness Visit    Chief Complaint   Patient presents with   • Hypertension   • Hypothyroidism   • Hyperlipidemia          • Follow-up     3 month F/U, patient [facility]ology is discussing a Total knee replacement on her left knee.   • Medicare Wellness-subsequent     Subjective    History of Present Illness:  Nafisa Washington is a 76 y.o. female who presents for a Subsequent Medicare Wellness Visit and to follow-up on chronic conditions    HTN, stable on meds and takes as directed, denies chest pain, headache, shortness of air, palpitations and swelling of extremities.     Hyperlipidemia, The patient denies muscle aches, constipation, diarrhea, GI upset, fatigue, chest pain/pressure, exercise intolerance, dyspnea, palpitations, syncope and pedal edema.      Hypothyroidism, with history of thyroid ablation by what sounds like radioactive substance.  TSH has been fluctuating but stable in April, patient on levothyroxine 125, feels stable on medication, however she does report change in voice with singing lately, feels easily strained.  She also reports a sister with history of thyroid cancer.  Denies symptoms of constipation, weight gain/loss, hot or cold intolerance, hair loss, abnormal heart rate and fatigue.     Erosive osteoarthritis of multiple joints, osteoporosis, follows with rheumatology on sulfasalazine and Reclast.    -DEXA planned    Primary osteoarthritis of left knee, has been following with Dr. Samir San and receiving injections to both knees.  She is discussing left TKR with rheumatology, is planning to be referred to Dr. Lozada after x-ray completed next week for surgical evaluation.  She has been working on weight loss prior to surgery.     Asthma, Stable on Advair    Last colonoscopy in [place], patient has been referred to gastroenterology      Past Medical History:   Diagnosis Date   • Asthma    • Disease of thyroid  gland    • Hyperlipidemia    • Hypertension    • Macular degeneration      Past Surgical History:   Procedure Laterality Date   • ADENOIDECTOMY     • DENTAL PROCEDURE     • HYSTERECTOMY      Full    • TONSILLECTOMY       The following portions of the patient's history were reviewed and   updated as appropriate: allergies, current medications, past family history, past medical history, past social history, past surgical history and problem list.    Compared to one year ago, the patient feels her physical   health is worse.    Compared to one year ago, the patient feels her mental   health is the same.    Recent Hospitalizations:  She was not admitted to the hospital during the last year.       Current Medical Providers:  Patient Care Team:  Nicolle Ng APRN as PCP - General (Nurse Practitioner)  Deion Garcia Jr., MD    Outpatient Medications Prior to Visit   Medication Sig Dispense Refill   • albuterol sulfate  (90 Base) MCG/ACT inhaler INHLE 2 PUFFS 4 TIMES A DAY AS NEEDED 18 g 2   • cholecalciferol (VITAMIN D3) 1000 units tablet Take 1,000 Units by mouth Daily.     • Ferrous Sulfate Dried  (50 Fe) MG tablet controlled-release Take  by mouth.     • fluticasone-salmeterol (Advair Diskus) 100-50 MCG/DOSE DISKUS Inhale 1 puff 2 (Two) Times a Day. 60 each 5   • Multiple Vitamin (MULTI-VITAMIN DAILY PO) Take  by mouth.     • multivitamins-minerals (PRESERVISION AREDS 2) capsule capsule Take 1 capsule by mouth 2 (Two) Times a Day.     • sulfaSALAzine (AZULFIDINE) 500 MG tablet Take 1 tablet by mouth 2 (Two) Times a Day.     • zoledronic acid (RECLAST) 5 MG/100ML solution infusion Infuse 100 mL into a venous catheter 1 (One) Time.     • amLODIPine (NORVASC) 10 MG tablet TAKE 1 TABLET BY MOUTH EVERY DAY 90 tablet 1   • atorvastatin (LIPITOR) 10 MG tablet TAKE 1 TABLET BY MOUTH EVERY DAY 90 tablet 1   • fosinopril (MONOPRIL) 20 MG tablet Take 1 tablet by mouth 2 (Two) Times a Day. 180 tablet 1   •  "hydroCHLOROthiazide (HYDRODIURIL) 25 MG tablet TAKE 1 TABLET BY MOUTH EVERY DAY 90 tablet 1   • levothyroxine (SYNTHROID, LEVOTHROID) 100 MCG tablet TAKE 1 TABLET BY MOUTH EVERY DAY 90 tablet 0   • sulfamethoxazole-trimethoprim (BACTRIM DS,SEPTRA DS) 800-160 MG per tablet Take 1 tablet by mouth 2 (Two) Times a Day. 20 tablet 0     No facility-administered medications prior to visit.       No opioid medication identified on active medication list. I have reviewed chart for other potential  high risk medication/s and harmful drug interactions in the elderly.          Aspirin is not on active medication list.  Aspirin use is not indicated based on review of current medical condition/s. Risk of harm outweighs potential benefits.  .    Patient Active Problem List   Diagnosis   • Asthma with COPD (HCC)   • Encounter for general adult medical examination without abnormal findings   • Family history of malignant neoplasm of breast   • Fibrocystic breast changes   • Hypertension   • Hypothyroidism   • Macular degeneration     Advance Care Planning  Advance Directive is not on file.  ACP discussion was held with the patient during this visit. Patient has an advance directive (not in EMR), copy requested.          Objective    Vitals:    08/24/22 1326   BP: 148/68   BP Location: Left arm   Patient Position: Sitting   Cuff Size: Adult   Pulse: 71   Resp: 20   Temp: 97.8 °F (36.6 °C)   TempSrc: Temporal   SpO2: 100%   Weight: 85.7 kg (189 lb)   Height: 167.6 cm (66\")     Estimated body mass index is 30.51 kg/m² as calculated from the following:    Height as of this encounter: 167.6 cm (66\").    Weight as of this encounter: 85.7 kg (189 lb).    BMI is >= 30 and <35. (Class 1 Obesity). The following options were offered after discussion;: exercise counseling/recommendations and nutrition counseling/recommendations      Does the patient have evidence of cognitive impairment? No    Physical Exam  Vitals and nursing note reviewed. "   Constitutional:       General: She is not in acute distress.     Appearance: She is well-developed. She is not diaphoretic.   Eyes:      Pupils: Pupils are equal, round, and reactive to light.   Neck:      Thyroid: No thyromegaly.      Vascular: No JVD.   Cardiovascular:      Rate and Rhythm: Normal rate and regular rhythm.      Heart sounds: Normal heart sounds. No murmur heard.  Pulmonary:      Effort: Pulmonary effort is normal. No respiratory distress.      Breath sounds: Normal breath sounds. No wheezing.   Abdominal:      General: Bowel sounds are normal. There is no distension.      Palpations: Abdomen is soft.      Tenderness: There is no abdominal tenderness.   Musculoskeletal:         General: Tenderness (chronic B hand OA, B knee L>R OA, severe alvarez rom of L knee. ) present. No swelling. Normal range of motion.      Cervical back: Normal range of motion and neck supple.   Skin:     General: Skin is warm and dry.   Neurological:      Mental Status: She is alert and oriented to person, place, and time.      Sensory: No sensory deficit.   Psychiatric:         Behavior: Behavior normal.         Thought Content: Thought content normal.         Judgment: Judgment normal.                 HEALTH RISK ASSESSMENT    Smoking Status:  Social History     Tobacco Use   Smoking Status Never Smoker   Smokeless Tobacco Never Used     Alcohol Consumption:  Social History     Substance and Sexual Activity   Alcohol Use Never     Fall Risk Screen:    [facility], and patient is at MODERATE risk for falls. Assessment completed on:8/24/2022    Depression Screening:  PHQ-2/PHQ-9 Depression Screening 1/26/2022   Retired PHQ-9 Total Score 3   Retired Total Score 3       Health Habits and Functional and Cognitive Screening:  Functional & Cognitive Status 8/24/2022   Do you have difficulty preparing food and eating? No   Do you have difficulty bathing yourself, getting dressed or grooming yourself? No    Do you have difficulty using the toilet? No   Do you have difficulty moving around from place to place? Yes   Do you have trouble with steps or getting out of a bed or a chair? Yes   Do you need help using the phone?  No   Are you deaf or do you have serious difficulty hearing?  No   Do you need help with transportation? No   Do you need help shopping? No   Do you need help preparing meals?  No   Do you need help with housework?  No   Do you need help with laundry? No   Do you need help taking your medications? No   Do you need help managing money? No   Do you ever drive or ride in a car without wearing a seat belt? No   Have you felt unusual stress, anger or loneliness in the last month? No   Who do you live with? Spouse   If you need help, do you have trouble finding someone available to you? No   Have you been bothered in the last four weeks by sexual problems? No   Do you have difficulty concentrating, remembering or making decisions? No     ATTENTION  What is the year: correct  What is the month of the year: correct  What is the day of the week?: correct  What is the date?: correct  MEMORY  Repeat address three times, only score third attempt: [address], [place]: 7  HOW MANY ANIMALS DID THE PATIENT NAME  Verbal Fluency -- Animal Names (0-25): 22+  CLOCK DRAWING  Clock Drawing: All Correct  MEMORY RECALL  Tell me what you remember about that name and address we were repeating at the beginning: 3  ACE TOTAL SCORE  Total ACE Score - <25/30 strongly suggests cognitive impairment; <21/30 almost certainly shows dementia: 26          Age-appropriate Screening Schedule:  Refer to the list below for future screening recommendations based on patient's age, sex and/or medical conditions. Orders for these recommended tests are listed in the plan section. The patient has been provided with a written plan.    Health Maintenance   Topic Date Due   • ZOSTER VACCINE (1 of 2) Never done   • DXA  SCAN  02/27/2022   • INFLUENZA VACCINE  10/01/2022   • LIPID PANEL  01/19/2023   • TDAP/TD VACCINES (2 - Tdap) 10/27/2029              Assessment & Plan   CMS Preventative Services Quick Reference  Risk Factors Identified During Encounter  Chronic Pain   Immunizations Discussed/Encouraged (specific Immunizations; Prevnar 20 (Pneumococcal 20-valent conjugate) and Shingrix  Obesity/Overweight      The above risks/problems have been discussed with the patient.  Follow up actions/plans if indicated are seen below in the Assessment/Plan Section.  Pertinent information has been shared with the patient in the After Visit Summary.      Diagnoses and all orders for this visit:    1. Medicare annual wellness visit, subsequent (Primary)  Comments:  Labs today as ordered  Colonoscopy to be scheduled  DEXA planned per rheumatology    2. Essential hypertension  Comments:  BP slightly elevated today but stable.  Continue medications as directed  Orders:  -     amLODIPine (NORVASC) 10 MG tablet; Take 1 tablet by mouth Daily.  Dispense: 90 tablet; Refill: 1  -     hydroCHLOROthiazide (HYDRODIURIL) 25 MG tablet; Take 1 tablet by mouth Daily.  Dispense: 90 tablet; Refill: 1  -     Comprehensive Metabolic Panel  -     CBC (No Diff)    3. Mixed hyperlipidemia  -     atorvastatin (LIPITOR) 10 MG tablet; Take 1 tablet by mouth Daily.  Dispense: 90 tablet; Refill: 1  -     Lipid Panel    4. Moderate persistent asthma without complication    5. Acquired hypothyroidism  Comments:  TSH today, continue levothyroxine, will adjust as needed.  Check thyroid ultrasound  Orders:  -     TSH  -     US Thyroid; Future    6. Breast cancer screening by mammogram  -     Mammo Screening Digital Tomosynthesis Bilateral With CAD; Future    7. Erosive osteoarthritis of multiple sites  Comments:  Continue with [facility] rheumatology as directed.     8. Colon cancer screening  Comments:  Patient to complete colonoscopy paperwork for [facility] and schedule  colonoscopy    9. Primary osteoarthritis of both knees  Comments:  Planning x-ray of the left knee per rheumatology, then referral to Dr. Lozada for possible left TKR.  Has only received injections by Dr. Lieberman    Other orders  -     fosinopril (MONOPRIL) 20 MG tablet; Take 1 tablet by mouth 2 (Two) Times a Day.  Dispense: 180 tablet; Refill: 1  -     levothyroxine (SYNTHROID, LEVOTHROID) 100 MCG tablet; Take 1 tablet by mouth Daily.  Dispense: 90 tablet; Refill: 1      Age appropriate preventative counseling provided, including healthy lifestyle modifications and exercise    Follow Up:   Return in about 6 months (around 2/24/2023) for Recheck.     An After Visit Summary and PPPS were made available to the patient.          I spent 35 minutes caring for Nafisa on this date of service. This time includes time spent by me in the following activities:preparing for the visit, reviewing tests, obtaining and/or reviewing a separately obtained history, counseling and educating the patient/family/caregiver, ordering medications, tests, or procedures and documenting information in the medical record         EMR Dragon transcription disclaimer:  Part of this note may be an electronic transcription/translation of spoken language to printed text using the Dragon Dictation System.

## 2022-08-25 LAB
ALBUMIN SERPL-MCNC: 4.7 G/DL (ref 3.5–5.2)
ALBUMIN/GLOB SERPL: 1.8 G/DL
ALP SERPL-CCNC: 75 U/L (ref 39–117)
ALT SERPL W P-5'-P-CCNC: 19 U/L (ref 1–33)
ANION GAP SERPL CALCULATED.3IONS-SCNC: 11 MMOL/L (ref 5–15)
AST SERPL-CCNC: 27 U/L (ref 1–32)
BILIRUB SERPL-MCNC: 0.6 MG/DL (ref 0–1.2)
BUN SERPL-MCNC: 14 MG/DL (ref 8–23)
BUN/CREAT SERPL: 23.7 (ref 7–25)
CALCIUM SPEC-SCNC: 9.7 MG/DL (ref 8.6–10.5)
CHLORIDE SERPL-SCNC: 94 MMOL/L (ref 98–107)
CHOLEST SERPL-MCNC: 199 MG/DL (ref 0–200)
CO2 SERPL-SCNC: 26 MMOL/L (ref 22–29)
CREAT SERPL-MCNC: 0.59 MG/DL (ref 0.57–1)
DEPRECATED RDW RBC AUTO: 44.1 FL (ref 37–54)
EGFRCR SERPLBLD CKD-EPI 2021: 94.1 ML/MIN/1.73
ERYTHROCYTE [DISTWIDTH] IN BLOOD BY AUTOMATED COUNT: 13.1 % (ref 12.3–15.4)
GLOBULIN UR ELPH-MCNC: 2.6 GM/DL
GLUCOSE SERPL-MCNC: 107 MG/DL (ref 65–99)
HCT VFR BLD AUTO: 42.5 % (ref 34–46.6)
HDLC SERPL-MCNC: 91 MG/DL (ref 40–60)
HGB BLD-MCNC: 14 G/DL (ref 12–15.9)
LDLC SERPL CALC-MCNC: 98 MG/DL (ref 0–100)
LDLC/HDLC SERPL: 1.07 {RATIO}
MCH RBC QN AUTO: 30.3 PG (ref 26.6–33)
MCHC RBC AUTO-ENTMCNC: 32.9 G/DL (ref 31.5–35.7)
MCV RBC AUTO: 92 FL (ref 79–97)
PLATELET # BLD AUTO: 287 10*3/MM3 (ref 140–450)
PMV BLD AUTO: 10.6 FL (ref 6–12)
POTASSIUM SERPL-SCNC: 4.2 MMOL/L (ref 3.5–5.2)
PROT SERPL-MCNC: 7.3 G/DL (ref 6–8.5)
RBC # BLD AUTO: 4.62 10*6/MM3 (ref 3.77–5.28)
SODIUM SERPL-SCNC: 131 MMOL/L (ref 136–145)
TRIGL SERPL-MCNC: 52 MG/DL (ref 0–150)
TSH SERPL DL<=0.05 MIU/L-ACNC: 0.45 UIU/ML (ref 0.27–4.2)
VLDLC SERPL-MCNC: 10 MG/DL (ref 5–40)
WBC NRBC COR # BLD: 7.86 10*3/MM3 (ref 3.4–10.8)

## 2022-08-29 ENCOUNTER — TELEPHONE (OUTPATIENT)
Dept: FAMILY MEDICINE CLINIC | Facility: CLINIC | Age: 77
End: 2022-08-29

## 2022-09-06 DIAGNOSIS — J45.40 MODERATE PERSISTENT ASTHMA WITHOUT COMPLICATION: ICD-10-CM

## 2022-09-07 RX ORDER — FLUTICASONE PROPIONATE AND SALMETEROL 100; 50 UG/1; UG/1
POWDER RESPIRATORY (INHALATION)
Qty: 60 EACH | Refills: 5 | Status: SHIPPED | OUTPATIENT
Start: 2022-09-07 | End: 2023-01-18 | Stop reason: CLARIF

## 2022-09-12 RX ORDER — ALBUTEROL SULFATE 90 UG/1
AEROSOL, METERED RESPIRATORY (INHALATION)
Qty: 18 G | Refills: 2 | Status: SHIPPED | OUTPATIENT
Start: 2022-09-12 | End: 2022-12-12

## 2022-10-11 ENCOUNTER — FLU SHOT (OUTPATIENT)
Dept: FAMILY MEDICINE CLINIC | Facility: CLINIC | Age: 77
End: 2022-10-11

## 2022-10-11 DIAGNOSIS — Z23 NEED FOR INFLUENZA VACCINATION: Primary | ICD-10-CM

## 2022-10-11 PROCEDURE — 90662 IIV NO PRSV INCREASED AG IM: CPT | Performed by: NURSE PRACTITIONER

## 2022-10-11 PROCEDURE — G0008 ADMIN INFLUENZA VIRUS VAC: HCPCS | Performed by: NURSE PRACTITIONER

## 2022-12-12 RX ORDER — ALBUTEROL SULFATE 90 UG/1
AEROSOL, METERED RESPIRATORY (INHALATION)
Qty: 18.5 G | Refills: 2 | Status: SHIPPED | OUTPATIENT
Start: 2022-12-12

## 2023-01-03 DIAGNOSIS — I10 ESSENTIAL HYPERTENSION: ICD-10-CM

## 2023-01-03 DIAGNOSIS — E78.2 MIXED HYPERLIPIDEMIA: ICD-10-CM

## 2023-01-03 RX ORDER — LEVOTHYROXINE SODIUM 0.1 MG/1
TABLET ORAL
Qty: 90 TABLET | Refills: 1 | Status: SHIPPED | OUTPATIENT
Start: 2023-01-03

## 2023-01-03 RX ORDER — ATORVASTATIN CALCIUM 10 MG/1
TABLET, FILM COATED ORAL
Qty: 90 TABLET | Refills: 1 | Status: SHIPPED | OUTPATIENT
Start: 2023-01-03

## 2023-01-03 RX ORDER — HYDROCHLOROTHIAZIDE 25 MG/1
TABLET ORAL
Qty: 90 TABLET | Refills: 1 | Status: SHIPPED | OUTPATIENT
Start: 2023-01-03

## 2023-01-03 RX ORDER — AMLODIPINE BESYLATE 10 MG/1
TABLET ORAL
Qty: 90 TABLET | Refills: 1 | Status: SHIPPED | OUTPATIENT
Start: 2023-01-03

## 2023-01-17 ENCOUNTER — TELEPHONE (OUTPATIENT)
Dept: FAMILY MEDICINE CLINIC | Facility: CLINIC | Age: 78
End: 2023-01-17
Payer: MEDICARE

## 2023-01-17 NOTE — TELEPHONE ENCOUNTER
I received a fax from pt's pharmacy asking for an alternative for Wixela be sent in. They recommended Advair or Breo

## 2023-01-18 DIAGNOSIS — J45.40 MODERATE PERSISTENT ASTHMA WITHOUT COMPLICATION: Primary | ICD-10-CM

## 2023-01-18 RX ORDER — FLUTICASONE PROPIONATE AND SALMETEROL XINAFOATE 115; 21 UG/1; UG/1
2 AEROSOL, METERED RESPIRATORY (INHALATION)
Qty: 12 G | Refills: 11 | Status: SHIPPED | OUTPATIENT
Start: 2023-01-18 | End: 2023-02-27

## 2023-02-01 DIAGNOSIS — Z12.31 BREAST CANCER SCREENING BY MAMMOGRAM: ICD-10-CM

## 2023-02-27 ENCOUNTER — OFFICE VISIT (OUTPATIENT)
Dept: FAMILY MEDICINE CLINIC | Facility: CLINIC | Age: 78
End: 2023-02-27
Payer: MEDICARE

## 2023-02-27 VITALS
BODY MASS INDEX: 29.13 KG/M2 | SYSTOLIC BLOOD PRESSURE: 124 MMHG | HEIGHT: 67 IN | HEART RATE: 68 BPM | OXYGEN SATURATION: 98 % | DIASTOLIC BLOOD PRESSURE: 64 MMHG | WEIGHT: 185.6 LBS

## 2023-02-27 DIAGNOSIS — E78.2 MIXED HYPERLIPIDEMIA: ICD-10-CM

## 2023-02-27 DIAGNOSIS — M15.4 EROSIVE OSTEOARTHRITIS OF MULTIPLE SITES: ICD-10-CM

## 2023-02-27 DIAGNOSIS — J45.40 MODERATE PERSISTENT ASTHMA WITHOUT COMPLICATION: Primary | ICD-10-CM

## 2023-02-27 DIAGNOSIS — Z12.11 COLON CANCER SCREENING: ICD-10-CM

## 2023-02-27 DIAGNOSIS — N39.46 MIXED STRESS AND URGE URINARY INCONTINENCE: ICD-10-CM

## 2023-02-27 DIAGNOSIS — Z23 NEED FOR PNEUMOCOCCAL VACCINATION: ICD-10-CM

## 2023-02-27 DIAGNOSIS — D22.9 NEVUS: ICD-10-CM

## 2023-02-27 DIAGNOSIS — M17.0 PRIMARY OSTEOARTHRITIS OF BOTH KNEES: ICD-10-CM

## 2023-02-27 DIAGNOSIS — E03.9 ACQUIRED HYPOTHYROIDISM: ICD-10-CM

## 2023-02-27 DIAGNOSIS — I10 ESSENTIAL HYPERTENSION: ICD-10-CM

## 2023-02-27 PROCEDURE — 99214 OFFICE O/P EST MOD 30 MIN: CPT | Performed by: NURSE PRACTITIONER

## 2023-02-27 PROCEDURE — 90677 PCV20 VACCINE IM: CPT | Performed by: NURSE PRACTITIONER

## 2023-02-27 PROCEDURE — G0009 ADMIN PNEUMOCOCCAL VACCINE: HCPCS | Performed by: NURSE PRACTITIONER

## 2023-02-27 RX ORDER — OXYBUTYNIN CHLORIDE 5 MG/1
5 TABLET ORAL DAILY
Qty: 90 TABLET | Refills: 1 | Status: SHIPPED | OUTPATIENT
Start: 2023-02-27

## 2023-02-27 RX ORDER — FOSINOPRIL SODIUM 20 MG/1
20 TABLET ORAL 2 TIMES DAILY
Qty: 180 TABLET | Refills: 1 | Status: SHIPPED | OUTPATIENT
Start: 2023-02-27

## 2023-02-27 RX ORDER — FLUTICASONE PROPIONATE AND SALMETEROL 100; 50 UG/1; UG/1
1 POWDER RESPIRATORY (INHALATION) 2 TIMES DAILY
Qty: 60 EACH | Refills: 11 | Status: SHIPPED | OUTPATIENT
Start: 2023-02-27

## 2023-02-27 NOTE — PROGRESS NOTES
Chief Complaint  Chief Complaint   Patient presents with   • Follow-up     Pt would like to discuss referral colonoscopy and dermatology. Pt would like to discuss medications            Subjective          Nafisa Washington presents to [facility] for   History of Present Illness     HTN, stable on meds and takes as directed, denies chest pain, headache, shortness of air, palpitations and swelling of extremities.      Hyperlipidemia, The patient denies muscle aches, constipation, diarrhea, GI upset, fatigue, chest pain/pressure, exercise intolerance, dyspnea, palpitations, syncope and pedal edema.       Hypothyroidism, with history of thyroid ablation.  TSH has been stable, patient on levothyroxine 125. She has a sister with history of thyroid cancer and pt's thyroid ultrasound normal 2022.  Denies symptoms of constipation, hot or cold intolerance, hair loss, abnormal heart rate and fatigue.      Erosive osteoarthritis of multiple joints, osteoporosis, follows with rheumatology on sulfasalazine and Reclast.    -DEXA completed      Primary osteoarthritis of left knee, has been following with Dr. Samir San and receiving injections to both knees.  She is discussing left TKR with rheumatology, is planning future referral to Dr. Lozada. She continues working on weight loss     Asthma, Stable on Advair, however there was an insurance issue and requested a substitute for Advair diskus, Advair HFA was ordered, patient reports she has met her deductible and would like to return to using the Advair Diskus instead, she does continue using albuterol if needed.     She complains of urinary incontinence with sneeze, cough or lifting objects or bending, wears a pad daily     Last colonoscopy in [place], patient has been referred to gastroenterology in past     Would like referral to dermatology to have moles checked      The following portions of the patient's history were reviewed and updated  as appropriate: allergies, current medications, past family history, past medical history, past social history, past surgical history and problem list.    Past Medical History:   Diagnosis Date   • Asthma    • Disease of thyroid gland    • Hyperlipidemia    • Hypertension    • Macular degeneration      Past Surgical History:   Procedure Laterality Date   • ADENOIDECTOMY     • DENTAL PROCEDURE     • HYSTERECTOMY      Full    • TONSILLECTOMY       History reviewed. No pertinent family history.  Social History     Tobacco Use   • Smoking status: Never   • Smokeless tobacco: Never   Substance Use Topics   • Alcohol use: Never       Current Outpatient Medications:   •  albuterol sulfate  (90 Base) MCG/ACT inhaler, INHLE 2 PUFFS 4 TIMES A DAY AS NEEDED, Disp: 18.5 g, Rfl: 2  •  amLODIPine (NORVASC) 10 MG tablet, TAKE 1 TABLET BY MOUTH EVERY DAY, Disp: 90 tablet, Rfl: 1  •  atorvastatin (LIPITOR) 10 MG tablet, TAKE 1 TABLET BY MOUTH EVERY DAY, Disp: 90 tablet, Rfl: 1  •  cholecalciferol (VITAMIN D3) 1000 units tablet, Take 1,000 Units by mouth Daily., Disp: , Rfl:   •  Ferrous Sulfate Dried  (50 Fe) MG tablet controlled-release, Take  by mouth., Disp: , Rfl:   •  Fluticasone-Salmeterol (Advair Diskus) 100-50 MCG/ACT DISKUS, Inhale 1 puff 2 (Two) Times a Day., Disp: 60 each, Rfl: 11  •  fosinopril (MONOPRIL) 20 MG tablet, Take 1 tablet by mouth 2 (Two) Times a Day., Disp: 180 tablet, Rfl: 1  •  hydroCHLOROthiazide (HYDRODIURIL) 25 MG tablet, TAKE 1 TABLET BY MOUTH EVERY DAY, Disp: 90 tablet, Rfl: 1  •  levothyroxine (SYNTHROID, LEVOTHROID) 100 MCG tablet, TAKE 1 TABLET BY MOUTH EVERY DAY, Disp: 90 tablet, Rfl: 1  •  Multiple Vitamin (MULTI-VITAMIN DAILY PO), Take  by mouth., Disp: , Rfl:   •  multivitamins-minerals (PRESERVISION AREDS 2) capsule capsule, Take 1 capsule by mouth 2 (Two) Times a Day., Disp: , Rfl:   •  sulfaSALAzine (AZULFIDINE) 500 MG tablet, Take 1 tablet by mouth 2 (Two) Times a Day., Disp: ,  "Rfl:   •  zoledronic acid (RECLAST) 5 MG/100ML solution infusion, Infuse 100 mL into a venous catheter 1 (One) Time., Disp: , Rfl:   •  oxybutynin (DITROPAN) 5 MG tablet, Take 1 tablet by mouth Daily., Disp: 90 tablet, Rfl: 1    Objective   Vital Signs:   /64 (BP Location: Left arm, Patient Position: Sitting, Cuff Size: Adult)   Pulse 68   Ht 170.2 cm (67\")   Wt 84.2 kg (185 lb 9.6 oz)   SpO2 98%   BMI 29.07 kg/m²           Physical Exam  Vitals and nursing note reviewed.   Constitutional:       General: She is not in acute distress.     Appearance: Normal appearance. She is well-developed. She is not ill-appearing or diaphoretic.   Eyes:      Pupils: Pupils are equal, round, and reactive to light.   Neck:      Thyroid: No thyromegaly.      Vascular: No JVD.   Cardiovascular:      Rate and Rhythm: Normal rate and regular rhythm.      Heart sounds: Normal heart sounds. No murmur heard.  Pulmonary:      Effort: Pulmonary effort is normal. No respiratory distress.      Breath sounds: Normal breath sounds. No wheezing.   Abdominal:      General: Bowel sounds are normal. There is no distension.      Palpations: Abdomen is soft.      Tenderness: There is no abdominal tenderness.   Musculoskeletal:         General: Tenderness (chronic B hand OA, B knee L>R OA, severe alvarez rom of L knee. ) present. No swelling. Normal range of motion.      Cervical back: Normal range of motion and neck supple.   Skin:     General: Skin is warm and dry.      Findings: Lesion (Scattered nevus) present.   Neurological:      General: No focal deficit present.      Mental Status: She is alert and oriented to person, place, and time. Mental status is at baseline.      Sensory: No sensory deficit.   Psychiatric:         Mood and Affect: Mood normal.         Behavior: Behavior normal.         Thought Content: Thought content normal.         Judgment: Judgment normal.          Result Review :     No visits with results within 7 Day(s) from " this visit.   Latest known visit with results is:   Office Visit on 08/24/2022   Component Date Value Ref Range Status   • Total Cholesterol 08/24/2022 199  0 - 200 mg/dL Final   • Triglycerides 08/24/2022 52  0 - 150 mg/dL Final   • HDL Cholesterol 08/24/2022 91 (H)  40 - 60 mg/dL Final   • LDL Cholesterol  08/24/2022 98  0 - 100 mg/dL Final   • VLDL Cholesterol 08/24/2022 10  5 - 40 mg/dL Final   • LDL/HDL Ratio 08/24/2022 1.07   Final   • Glucose 08/24/2022 107 (H)  65 - 99 mg/dL Final   • BUN 08/24/2022 14  8 - 23 mg/dL Final   • Creatinine 08/24/2022 0.59  0.57 - 1.00 mg/dL Final   • Sodium 08/24/2022 131 (L)  136 - 145 mmol/L Final   • Potassium 08/24/2022 4.2  3.5 - 5.2 mmol/L Final   • Chloride 08/24/2022 94 (L)  98 - 107 mmol/L Final   • CO2 08/24/2022 26.0  22.0 - 29.0 mmol/L Final   • Calcium 08/24/2022 9.7  8.6 - 10.5 mg/dL Final   • Total Protein 08/24/2022 7.3  6.0 - 8.5 g/dL Final   • Albumin 08/24/2022 4.70  3.50 - 5.20 g/dL Final   • ALT (SGPT) 08/24/2022 19  1 - 33 U/L Final   • AST (SGOT) 08/24/2022 27  1 - 32 U/L Final   • Alkaline Phosphatase 08/24/2022 75  39 - 117 U/L Final   • Total Bilirubin 08/24/2022 0.6  0.0 - 1.2 mg/dL Final   • Globulin 08/24/2022 2.6  gm/dL Final   • A/G Ratio 08/24/2022 1.8  g/dL Final   • BUN/Creatinine Ratio 08/24/2022 23.7  7.0 - 25.0 Final   • Anion Gap 08/24/2022 11.0  5.0 - 15.0 mmol/L Final   • eGFR 08/24/2022 94.1  >60.0 mL/min/1.73 Final    National Kidney Foundation and American Society of Nephrology (ASN) Task Force recommended calculation based on the Chronic Kidney Disease Epidemiology Collaboration (CKD-EPI) equation refit without adjustment for race.   • WBC 08/24/2022 7.86  3.40 - 10.80 10*3/mm3 Final   • RBC 08/24/2022 4.62  3.77 - 5.28 10*6/mm3 Final   • Hemoglobin 08/24/2022 14.0  12.0 - 15.9 g/dL Final   • Hematocrit 08/24/2022 42.5  34.0 - 46.6 % Final   • MCV 08/24/2022 92.0  79.0 - 97.0 fL Final   • MCH 08/24/2022 30.3  26.6 - 33.0 pg Final   •  MCHC 08/24/2022 32.9  31.5 - 35.7 g/dL Final   • RDW 08/24/2022 13.1  12.3 - 15.4 % Final   • RDW-SD 08/24/2022 44.1  37.0 - 54.0 fl Final   • MPV 08/24/2022 10.6  6.0 - 12.0 fL Final   • Platelets 08/24/2022 287  140 - 450 10*3/mm3 Final   • TSH 08/24/2022 0.445  0.270 - 4.200 uIU/mL Final                  BMI is >= 25 and <30. (Overweight) The following options were offered after discussion;: exercise counseling/recommendations and nutrition counseling/recommendations           Assessment and Plan    Diagnoses and all orders for this visit:    1. Moderate persistent asthma without complication (Primary)  Comments:  pt desires Advair Diskus, will d/c HFA and reorder diskus, she has now hit deductible and will notify if there are any problems   cont albuterol prn  Orders:  -     Fluticasone-Salmeterol (Advair Diskus) 100-50 MCG/ACT DISKUS; Inhale 1 puff 2 (Two) Times a Day.  Dispense: 60 each; Refill: 11    2. Need for pneumococcal vaccination  -     Pneumococcal Conjugate Vaccine 20-Valent (PCV20)    3. Colon cancer screening  Comments:  Referral to [facility] for colonoscopy, last colonoscopy performed in [place]  Orders:  -     Ambulatory Referral to Gastroenterology    4. Nevus  Comments:  Referral to dermatology for full body skin check  Orders:  -     Ambulatory Referral to Dermatology    5. Mixed stress and urge urinary incontinence  Comments:  Recommend trial of oxybutynin, continue Kegel exercises    6. Essential hypertension  Comments:  bp stable, cont lisinopril, amlodipine and HCTZ. refill when needed    7. Mixed hyperlipidemia  Comments:  Previous lipids stable, continue atorvastatin  We will plan to recheck labs in 6 months    8. Primary osteoarthritis of both knees  Comments:  Planning for referral to Ortho in future    9. Acquired hypothyroidism  Comments:  TSH has been stable, continue levothyroxine    10. Erosive osteoarthritis of multiple sites  Comments:  Continue with  rheumatology, continue sulfasalazine.     Other orders  -     fosinopril (MONOPRIL) 20 MG tablet; Take 1 tablet by mouth 2 (Two) Times a Day.  Dispense: 180 tablet; Refill: 1  -     oxybutynin (DITROPAN) 5 MG tablet; Take 1 tablet by mouth Daily.  Dispense: 90 tablet; Refill: 1        I spent 30 minutes caring for Nafisa Washington on this date of service. This time includes time spent by me in the following activities: preparing for the visit, reviewing tests, performing a medically appropriate examination and/or evaluation , counseling and educating the patient/family/caregiver, ordering medications, tests, or procedures and documenting information in the medical record        Follow Up     Return in about 6 months (around 8/27/2023) for Medicare Wellness. HTN panel prior .  Patient was given instructions and counseling regarding her condition or for health maintenance advice. Please see specific information pulled into the AVS if appropriate.        Part of this note may be an electronic transcription/translation of spoken language to printed text using the Dragon Dictation System   Anesthesia Type: 1% lidocaine with 1:100,000 epinephrine and a 1:10 solution of 8.4% sodium bicarbonate

## 2023-02-27 NOTE — PROGRESS NOTES
Injection  Injection performed in left arm by Rand Villasenor MA. Patient tolerated the procedure well without complications.  02/27/23   Rand Villasenor MA

## 2023-06-14 ENCOUNTER — TELEPHONE (OUTPATIENT)
Dept: FAMILY MEDICINE CLINIC | Facility: CLINIC | Age: 78
End: 2023-06-14
Payer: MEDICARE

## 2023-06-14 NOTE — TELEPHONE ENCOUNTER
Left Detailed voicemessage regarding Gastro referral to call back to office. No packet for P office received. Previous discussed on 05/15 that patient turned in [facility] packet. Will need to complete new one to schedule.    Okay for Hub to Read/Relay  .

## 2023-07-05 ENCOUNTER — TELEPHONE (OUTPATIENT)
Dept: FAMILY MEDICINE CLINIC | Facility: CLINIC | Age: 78
End: 2023-07-05

## 2023-07-05 NOTE — TELEPHONE ENCOUNTER
Caller: Nafisa Washington    Relationship: Self    Best call back number:     What is the best time to reach you:     Who are you requesting to speak with (clinical staff, provider,  specific staff member):     Do you know the name of the person who called:     What was the call regarding: PATIENT IS CALLING IN STATING THAT SHE WILL BE GETTING HER COLONOSCOPY SOON.  SHE HAS BEEN DEALING WITH DEATH IN HER FAMILY AND WILL GET THAT SCHEDULED THIS WEEK.  SHE JUST WANTED DR PIERSON TO KNOW THAT SHE WAS NOT PUTTING THIS OFF     Is it okay if the provider responds through MyChart:

## 2023-07-27 ENCOUNTER — OFFICE VISIT (OUTPATIENT)
Dept: ORTHOPEDIC SURGERY | Facility: CLINIC | Age: 78
End: 2023-07-27
Payer: MEDICARE

## 2023-07-27 VITALS — HEART RATE: 83 BPM | OXYGEN SATURATION: 97 % | BODY MASS INDEX: 29.13 KG/M2 | WEIGHT: 185.6 LBS | HEIGHT: 67 IN

## 2023-07-27 DIAGNOSIS — G89.29 CHRONIC PAIN OF BOTH KNEES: ICD-10-CM

## 2023-07-27 DIAGNOSIS — M25.562 CHRONIC PAIN OF BOTH KNEES: ICD-10-CM

## 2023-07-27 DIAGNOSIS — M25.561 CHRONIC PAIN OF BOTH KNEES: ICD-10-CM

## 2023-07-27 DIAGNOSIS — M17.12 PRIMARY OSTEOARTHRITIS OF LEFT KNEE: Primary | ICD-10-CM

## 2023-07-27 DIAGNOSIS — M17.11 PRIMARY OSTEOARTHRITIS OF RIGHT KNEE: ICD-10-CM

## 2023-07-27 NOTE — PROGRESS NOTES
"Nafisa is a 77 y.o. year old female presents to [facility]    Chief Complaint   Patient presents with    Left Knee - Pain, Initial Evaluation    Right Knee - Pain, Initial Evaluation       History of Present Illness  Nafisa Washington is a 77 y.o. female presents to clinic for evaluation of bilateral knee pain, left greater than right, secondary to known osteoarthritis for years. Provocative factors: squatting, bending/kneeling, longer periods of walking, standing long periods, transcending stairs (reports going one step at a time). Previously treated by Dr Lieberman who has been providing her with HA, gel to both knees, of which her last injection was October, that provided great benefit.  Other past treatments: Intra-articular steroid injections, NSAIDs, tylenol extra strength, physical therapy. We discussed various treatment optiosn to include steroid, repeat HA, PRP, and bracing.     I have reviewed the patient's medical, family, and social history in detail and updated the computerized patient record.    Objective:  Pulse 83   Ht 170.2 cm (67\")   Wt 84.2 kg (185 lb 9.6 oz)   SpO2 97%   BMI 29.07 kg/m²      Physical Exam    Vital signs reviewed.   General: No acute distress.  Eyes: conjunctiva clear  ENT: external ears atraumatic  CV: no peripheral edema  Resp: normal respiratory effort  Skin: no rashes or wounds; normal turgor  Psych: mood and affect appropriate; recent and remote memory intact  Neuro: sensation to light touch intact    MSK Exam  Mild Antalgic  gait    Right knee:  Trace effusion. No ecchymosis no warmth  Extension 0, flexion 125  Tenderness along the medial joint line  Laxity with valgus stress  + retropatellar crepitus    Left knee:  Mild effusion. No ecchymosis, Mild warmth  Tenderness along the medial joint line  Extension 0, flexion 110  Laxity with varus and valgus stress      Imaging:  XR Knee 3 View Bilateral (07/27/2023 10:30)   Findings:  Left knee: " There is severe medial compartment joint space narrowing with bone-on-bone articulation and endplate sclerosis. There is marginal osteophyte formation. There is mild lateral and patellofemoral compartment osteophyte formation. There is no   joint effusion. There is no chondrocalcinosis or osseous erosion. Bone mineralization is normal.     Right knee: There is moderate medial and lateral compartment joint space narrowing with endplate osteophyte formation and sclerosis. There is mild patellofemoral compartment degenerative joint space narrowing. There is no joint effusion. No osseous   erosion or chondrocalcinosis.     IMPRESSION:    1. Severe medial compartment degenerative joint disease of the left knee with bone-on-bone articulation.  2. Moderate medial and lateral compartment degenerative joint disease of the right knee.    Assessment:  Diagnoses and all orders for this visit:    Primary osteoarthritis of left knee    Primary osteoarthritis of right knee    Chronic pain of both knees  -     XR Knee 3 View Bilateral      Plan: Recommend HA injection to bilateral knees to improve function with ADLs and subjective pain relief. In addition, recommend medial  knee brace to the left knee to provide less shear stress to the medial compartment and improved ambulation. Follow up once HA approved.     durable medical equipment  Greater than 15 minutes was spent demonstrating proper fit and use of the Medial  knee brace and signs to monitor for complications      Follow Up   No follow-ups on file.  Patient was given instructions and counseling regarding her condition or for health maintenance advice. Please see specific information pulled into the AVS if appropriate.     EMR Dragon/Transcription disclaimer:    Much of this encounter note is an electronic transcription/translation of spoken language to printed text.  The electronic translation of spoken language may permit erroneous, or at times, nonsensical  words or phrases to be inadvertently transcribed.  Although I have reviewed the note for such errors some may still exist.

## 2023-08-02 DIAGNOSIS — E78.2 MIXED HYPERLIPIDEMIA: ICD-10-CM

## 2023-08-02 RX ORDER — ATORVASTATIN CALCIUM 10 MG/1
TABLET, FILM COATED ORAL
Qty: 90 TABLET | Refills: 1 | Status: SHIPPED | OUTPATIENT
Start: 2023-08-02

## 2023-08-08 ENCOUNTER — TELEPHONE (OUTPATIENT)
Dept: ORTHOPEDIC SURGERY | Facility: CLINIC | Age: 78
End: 2023-08-08

## 2023-08-08 NOTE — TELEPHONE ENCOUNTER
Caller: Nafisa Washington    Relationship to patient: Self    Best call back number:     Chief complaint: BILATERAL KNEE PAIN    Type of visit: GEL INJECTION    Requested date: ASAP

## 2023-08-11 RX ORDER — ALBUTEROL SULFATE 90 UG/1
AEROSOL, METERED RESPIRATORY (INHALATION)
Qty: 18 G | Refills: 2 | Status: SHIPPED | OUTPATIENT
Start: 2023-08-11

## 2023-08-16 DIAGNOSIS — E78.2 MIXED HYPERLIPIDEMIA: Primary | ICD-10-CM

## 2023-08-16 DIAGNOSIS — I10 ESSENTIAL HYPERTENSION: ICD-10-CM

## 2023-08-17 ENCOUNTER — OFFICE VISIT (OUTPATIENT)
Dept: ORTHOPEDIC SURGERY | Facility: CLINIC | Age: 78
End: 2023-08-17
Payer: MEDICARE

## 2023-08-17 VITALS — WEIGHT: 185 LBS | BODY MASS INDEX: 29.03 KG/M2 | HEART RATE: 65 BPM | HEIGHT: 67 IN | OXYGEN SATURATION: 98 %

## 2023-08-17 DIAGNOSIS — M17.11 PRIMARY OSTEOARTHRITIS OF RIGHT KNEE: ICD-10-CM

## 2023-08-17 DIAGNOSIS — M17.12 PRIMARY OSTEOARTHRITIS OF LEFT KNEE: Primary | ICD-10-CM

## 2023-08-17 RX ORDER — SORBITOL SOLUTION 70 %
SOLUTION, ORAL MISCELLANEOUS
COMMUNITY
Start: 2023-07-26

## 2023-08-17 NOTE — PROGRESS NOTES
"   Patient ID: Nafisa Washington is a 77 y.o. female presents for bilateral knee Orthovisc, HA, injection to address her osteoarthritis.        Objective:  Pulse 65   Ht 170.2 cm (67\")   Wt 83.9 kg (185 lb)   SpO2 98%   BMI 28.98 kg/mý     Physical Examination:         Imaging:   None    Assessment:    Diagnoses and all orders for this visit:    1. Primary osteoarthritis of left knee (Primary)    2. Primary osteoarthritis of right knee       Plan: First injection to bilateral knees Orthovisc performed today. Follow up in 1 week for 2nd injection series to bilateral knees.     Large Joint Arthrocentesis: R knee  Date/Time: 8/17/2023 8:38 AM  Consent given by: patient  Site marked: site marked  Timeout: Immediately prior to procedure a time out was called to verify the correct patient, procedure, equipment, support staff and site/side marked as required   Supporting Documentation  Indications: pain   Procedure Details  Location: knee - R knee  Preparation: Patient was prepped and draped in the usual sterile fashion  Needle size: 22 G  Approach: anterolateral  Medications administered: 30 mg Hyaluronan 30 MG/2ML  Aspirate amount: 0 mL  Patient tolerance: patient tolerated the procedure well with no immediate complications      Large Joint Arthrocentesis: L knee  Date/Time: 8/17/2023 8:39 AM  Consent given by: patient  Site marked: site marked  Timeout: Immediately prior to procedure a time out was called to verify the correct patient, procedure, equipment, support staff and site/side marked as required   Supporting Documentation  Indications: pain   Procedure Details  Location: knee - L knee  Preparation: Patient was prepped and draped in the usual sterile fashion  Needle size: 22 G  Approach: anterolateral  Medications administered: 30 mg Hyaluronan 30 MG/2ML  Aspirate amount: 0 mL  Patient tolerance: patient tolerated the procedure well with no immediate complications        Disclaimer: Part of this note may be " an electronic transcription/translation of spoken language to printed text using the Dragon Dictation System

## 2023-08-22 ENCOUNTER — CLINICAL SUPPORT (OUTPATIENT)
Dept: FAMILY MEDICINE CLINIC | Facility: CLINIC | Age: 78
End: 2023-08-22
Payer: MEDICARE

## 2023-08-22 DIAGNOSIS — I10 HYPERTENSION, UNSPECIFIED TYPE: ICD-10-CM

## 2023-08-22 DIAGNOSIS — E03.9 HYPOTHYROIDISM, UNSPECIFIED TYPE: Primary | ICD-10-CM

## 2023-08-22 PROCEDURE — 80061 LIPID PANEL: CPT | Performed by: NURSE PRACTITIONER

## 2023-08-22 PROCEDURE — 85027 COMPLETE CBC AUTOMATED: CPT | Performed by: NURSE PRACTITIONER

## 2023-08-22 PROCEDURE — 36415 COLL VENOUS BLD VENIPUNCTURE: CPT | Performed by: NURSE PRACTITIONER

## 2023-08-22 PROCEDURE — 80053 COMPREHEN METABOLIC PANEL: CPT | Performed by: NURSE PRACTITIONER

## 2023-08-22 PROCEDURE — 84443 ASSAY THYROID STIM HORMONE: CPT | Performed by: NURSE PRACTITIONER

## 2023-08-22 NOTE — PROGRESS NOTES
Venipuncture Blood Specimen Collection  Venipuncture performed in the right arm by Jewels Cross MA with good hemostasis. Patient tolerated the procedure well without complications.   08/22/23   Jewels Cross MA

## 2023-08-23 LAB
ALBUMIN SERPL-MCNC: 4.5 G/DL (ref 3.5–5.2)
ALBUMIN/GLOB SERPL: 2 G/DL
ALP SERPL-CCNC: 61 U/L (ref 39–117)
ALT SERPL W P-5'-P-CCNC: 20 U/L (ref 1–33)
ANION GAP SERPL CALCULATED.3IONS-SCNC: 13 MMOL/L (ref 5–15)
AST SERPL-CCNC: 33 U/L (ref 1–32)
BILIRUB SERPL-MCNC: 0.5 MG/DL (ref 0–1.2)
BUN SERPL-MCNC: 7 MG/DL (ref 8–23)
BUN/CREAT SERPL: 12.7 (ref 7–25)
CALCIUM SPEC-SCNC: 9.4 MG/DL (ref 8.6–10.5)
CHLORIDE SERPL-SCNC: 92 MMOL/L (ref 98–107)
CHOLEST SERPL-MCNC: 212 MG/DL (ref 0–200)
CO2 SERPL-SCNC: 25 MMOL/L (ref 22–29)
CREAT SERPL-MCNC: 0.55 MG/DL (ref 0.57–1)
DEPRECATED RDW RBC AUTO: 42.7 FL (ref 37–54)
EGFRCR SERPLBLD CKD-EPI 2021: 95.1 ML/MIN/1.73
ERYTHROCYTE [DISTWIDTH] IN BLOOD BY AUTOMATED COUNT: 13 % (ref 12.3–15.4)
GLOBULIN UR ELPH-MCNC: 2.2 GM/DL
GLUCOSE SERPL-MCNC: 99 MG/DL (ref 65–99)
HCT VFR BLD AUTO: 39.8 % (ref 34–46.6)
HDLC SERPL-MCNC: 78 MG/DL (ref 40–60)
HGB BLD-MCNC: 13.4 G/DL (ref 12–15.9)
LDLC SERPL CALC-MCNC: 123 MG/DL (ref 0–100)
LDLC/HDLC SERPL: 1.57 {RATIO}
MCH RBC QN AUTO: 30.5 PG (ref 26.6–33)
MCHC RBC AUTO-ENTMCNC: 33.7 G/DL (ref 31.5–35.7)
MCV RBC AUTO: 90.5 FL (ref 79–97)
PLATELET # BLD AUTO: 267 10*3/MM3 (ref 140–450)
PMV BLD AUTO: 10.3 FL (ref 6–12)
POTASSIUM SERPL-SCNC: 4.3 MMOL/L (ref 3.5–5.2)
PROT SERPL-MCNC: 6.7 G/DL (ref 6–8.5)
RBC # BLD AUTO: 4.4 10*6/MM3 (ref 3.77–5.28)
SODIUM SERPL-SCNC: 130 MMOL/L (ref 136–145)
TRIGL SERPL-MCNC: 59 MG/DL (ref 0–150)
TSH SERPL DL<=0.05 MIU/L-ACNC: 0.34 UIU/ML (ref 0.27–4.2)
VLDLC SERPL-MCNC: 11 MG/DL (ref 5–40)
WBC NRBC COR # BLD: 4.29 10*3/MM3 (ref 3.4–10.8)

## 2023-08-24 ENCOUNTER — OFFICE VISIT (OUTPATIENT)
Dept: ORTHOPEDIC SURGERY | Facility: CLINIC | Age: 78
End: 2023-08-24
Payer: MEDICARE

## 2023-08-24 VITALS — HEART RATE: 65 BPM | OXYGEN SATURATION: 98 % | BODY MASS INDEX: 29.03 KG/M2 | HEIGHT: 67 IN | WEIGHT: 185 LBS

## 2023-08-24 DIAGNOSIS — M17.11 PRIMARY OSTEOARTHRITIS OF RIGHT KNEE: ICD-10-CM

## 2023-08-24 DIAGNOSIS — M17.12 PRIMARY OSTEOARTHRITIS OF LEFT KNEE: Primary | ICD-10-CM

## 2023-08-24 NOTE — PROGRESS NOTES
"   Patient ID: Nafisa Washington is a 77 y.o. female presents for bilateral orthovisc HA injection.         Objective:  Pulse 65   Ht 170.2 cm (67\")   Wt 83.9 kg (185 lb)   SpO2 98%   BMI 28.98 kg/mý     Physical Examination:    Right knee:  Intact skin.  No warmth.  No effusion  Extension 0, flexion 125    Left knee:  Intact skin.  No warmth no effusion  Extension 1, flexion 120    Imaging:   None    Assessment:    Diagnoses and all orders for this visit:    1. Primary osteoarthritis of left knee (Primary)    2. Primary osteoarthritis of right knee    Other orders  -     Large Joint Arthrocentesis  -     Large Joint Arthrocentesis       Plan: General restrictions were discussed.  Bilateral intra-articular Orthovisc injection performed today.  Follow-up next week for her third and final injection in the series.  All questions answered.    Large Joint Arthrocentesis: L knee  Date/Time: 8/24/2023 8:28 AM  Consent given by: patient  Site marked: site marked  Timeout: Immediately prior to procedure a time out was called to verify the correct patient, procedure, equipment, support staff and site/side marked as required   Supporting Documentation  Indications: pain   Procedure Details  Location: knee - L knee  Preparation: Patient was prepped and draped in the usual sterile fashion  Needle size: 22 G  Approach: anterolateral  Medications administered: 30 mg Hyaluronan 30 MG/2ML  Patient tolerance: patient tolerated the procedure well with no immediate complications      Large Joint Arthrocentesis: R knee  Date/Time: 8/24/2023 8:29 AM  Consent given by: patient  Site marked: site marked  Timeout: Immediately prior to procedure a time out was called to verify the correct patient, procedure, equipment, support staff and site/side marked as required   Supporting Documentation  Indications: pain   Procedure Details  Location: knee - R knee  Preparation: Patient was prepped and draped in the usual sterile fashion  Needle " size: 22 G  Approach: anterolateral  Medications administered: 30 mg Hyaluronan 30 MG/2ML  Patient tolerance: patient tolerated the procedure well with no immediate complications          Disclaimer: Part of this note may be an electronic transcription/translation of spoken language to printed text using the Dragon Dictation System

## 2023-08-28 RX ORDER — LEVOTHYROXINE SODIUM 0.1 MG/1
100 TABLET ORAL DAILY
Qty: 90 TABLET | Refills: 1 | Status: SHIPPED | OUTPATIENT
Start: 2023-08-28

## 2023-08-28 RX ORDER — OXYBUTYNIN CHLORIDE 5 MG/1
5 TABLET ORAL DAILY
Qty: 90 TABLET | Refills: 1 | Status: SHIPPED | OUTPATIENT
Start: 2023-08-28

## 2023-08-29 ENCOUNTER — OFFICE VISIT (OUTPATIENT)
Dept: FAMILY MEDICINE CLINIC | Facility: CLINIC | Age: 78
End: 2023-08-29
Payer: MEDICARE

## 2023-08-29 VITALS
WEIGHT: 183.4 LBS | BODY MASS INDEX: 28.79 KG/M2 | DIASTOLIC BLOOD PRESSURE: 69 MMHG | SYSTOLIC BLOOD PRESSURE: 124 MMHG | HEART RATE: 57 BPM | HEIGHT: 67 IN | OXYGEN SATURATION: 99 %

## 2023-08-29 DIAGNOSIS — I10 ESSENTIAL HYPERTENSION: ICD-10-CM

## 2023-08-29 DIAGNOSIS — E87.1 CHRONIC HYPONATREMIA: ICD-10-CM

## 2023-08-29 DIAGNOSIS — I10 HYPERTENSION, UNSPECIFIED TYPE: ICD-10-CM

## 2023-08-29 DIAGNOSIS — E78.2 MIXED HYPERLIPIDEMIA: ICD-10-CM

## 2023-08-29 DIAGNOSIS — J45.40 MODERATE PERSISTENT ASTHMA WITHOUT COMPLICATION: ICD-10-CM

## 2023-08-29 DIAGNOSIS — M15.4 EROSIVE OSTEOARTHRITIS OF MULTIPLE SITES: ICD-10-CM

## 2023-08-29 DIAGNOSIS — E03.9 HYPOTHYROIDISM, UNSPECIFIED TYPE: ICD-10-CM

## 2023-08-29 DIAGNOSIS — M17.0 PRIMARY OSTEOARTHRITIS OF BOTH KNEES: ICD-10-CM

## 2023-08-29 DIAGNOSIS — N39.46 MIXED STRESS AND URGE URINARY INCONTINENCE: ICD-10-CM

## 2023-08-29 DIAGNOSIS — Z00.00 MEDICARE ANNUAL WELLNESS VISIT, SUBSEQUENT: Primary | ICD-10-CM

## 2023-08-29 RX ORDER — HYDROCHLOROTHIAZIDE 25 MG/1
25 TABLET ORAL DAILY
Qty: 90 TABLET | Refills: 1 | Status: SHIPPED | OUTPATIENT
Start: 2023-08-29

## 2023-08-29 RX ORDER — AMLODIPINE BESYLATE 10 MG/1
10 TABLET ORAL DAILY
Qty: 90 TABLET | Refills: 1 | Status: SHIPPED | OUTPATIENT
Start: 2023-08-29

## 2023-08-29 RX ORDER — FOSINOPRIL SODIUM 20 MG/1
20 TABLET ORAL 2 TIMES DAILY
Qty: 180 TABLET | Refills: 1 | Status: SHIPPED | OUTPATIENT
Start: 2023-08-29

## 2023-08-29 NOTE — PROGRESS NOTES
The ABCs of the Annual Wellness Visit  Subsequent Medicare Wellness Visit    Chief Complaint   Patient presents with    Annual Exam     MWV         Subjective      Nafisa Washington is a 77 y.o. female who presents for a Subsequent Medicare Wellness Visit and to follow-up on chronic conditions and to review labs.    HTN, stable on meds and takes as directed, denies chest pain, headache, shortness of air, palpitations and swelling of extremities.      Hyperlipidemia, The patient denies muscle aches, constipation, diarrhea, GI upset, fatigue, chest pain/pressure, exercise intolerance, dyspnea, palpitations, syncope and pedal edema.       Hypothyroidism, with history of thyroid ablation.  TSH has been stable, patient on levothyroxine 125. She has a sister with history of thyroid cancer and pt's thyroid ultrasound normal 2022.  Denies symptoms of constipation, hot or cold intolerance, hair loss, abnormal heart rate and fatigue.      She reports having a sister that had breast cancer, she completes mammograms every year, up-to-date and normal in January.    Erosive osteoarthritis of multiple joints, osteoporosis, follows with rheumatology on sulfasalazine and Reclast.    -DEXA up to date     Primary osteoarthritis of left knee, she is now following with Dr. Eric Marcus and receiving injections to both knees. She is discussing possible left TKR, her  used to Dr. Lozada. She continues working on weight loss and is unable to be as active as she wants     Asthma, Stable on Advair diskus, she does continue using albuterol if needed.      Urinary urge/stress incontinence, improved on oxybutynin, still wears a pad daily     Last colonoscopy in [place], she reports colonoscopy is scheduled for September 27     Macular degeneration, seeing Dr. Isaac in [place].     She was seen by dermatology for full body skin check, no abnormal findings      The following portions of the patient's history were reviewed and    updated as appropriate: allergies, current medications, past family history, past medical history, past social history, past surgical history, and problem list.    Compared to one year ago, the patient feels her physical   health is worse.    Compared to one year ago, the patient feels her mental   health is the same.    Recent Hospitalizations:  She was not admitted to the hospital during the last year.       Current Medical Providers:  Patient Care Team:  Nicolle Ng APRN as PCP - General (Nurse Practitioner)  Deion Garcia Jr., MD    Outpatient Medications Prior to Visit   Medication Sig Dispense Refill    albuterol sulfate  (90 Base) MCG/ACT inhaler INHLE 2 PUFFS 4 TIMES A DAY AS NEEDED 18 g 2    atorvastatin (LIPITOR) 10 MG tablet TAKE 1 TABLET BY MOUTH EVERY DAY 90 tablet 1    cholecalciferol (VITAMIN D3) 1000 units tablet Take 1 tablet by mouth Daily.      Ferrous Sulfate Dried  (50 Fe) MG tablet controlled-release Take  by mouth.      Fluticasone-Salmeterol (Advair Diskus) 100-50 MCG/ACT DISKUS Inhale 1 puff 2 (Two) Times a Day. 60 each 11    levothyroxine (SYNTHROID, LEVOTHROID) 100 MCG tablet Take 1 tablet by mouth Daily. 90 tablet 1    Multiple Vitamin (MULTI-VITAMIN DAILY PO) Take  by mouth.      multivitamins-minerals (PRESERVISION AREDS 2) capsule capsule Take 1 capsule by mouth 2 (Two) Times a Day.      oxybutynin (DITROPAN) 5 MG tablet Take 1 tablet by mouth Daily. 90 tablet 1    sorbitol 70 % solution solution TAKE 100 ML BY MOUTH AS DIRECTED FOR 1 DAY      sulfaSALAzine (AZULFIDINE) 500 MG tablet Take 1 tablet by mouth 2 (Two) Times a Day.      zoledronic acid (RECLAST) 5 MG/100ML solution infusion Infuse 100 mL into a venous catheter 1 (One) Time.      amLODIPine (NORVASC) 10 MG tablet TAKE 1 TABLET BY MOUTH EVERY DAY 90 tablet 1    fosinopril (MONOPRIL) 20 MG tablet Take 1 tablet by mouth 2 (Two) Times a Day. 180 tablet 1    hydroCHLOROthiazide (HYDRODIURIL) 25 MG tablet  "TAKE 1 TABLET BY MOUTH EVERY DAY 90 tablet 1     No facility-administered medications prior to visit.       No opioid medication identified on active medication list. I have reviewed chart for other potential  high risk medication/s and harmful drug interactions in the elderly.        Aspirin is not on active medication list.  Aspirin use is not indicated based on review of current medical condition/s. Risk of harm outweighs potential benefits.  .    Patient Active Problem List   Diagnosis    Asthma with COPD    Encounter for general adult medical examination without abnormal findings    Family history of malignant neoplasm of breast    Fibrocystic breast changes    Hypertension    Hypothyroidism    Macular degeneration     Advance Care Planning   Advance Care Planning     Advance Directive is not on file.  ACP discussion was held with the patient during this visit. Patient does not have an advance directive, information provided.     Objective    Vitals:    08/29/23 0857   BP: 124/69   Pulse: 57   SpO2: 99%   Weight: 83.2 kg (183 lb 6.4 oz)   Height: 170.2 cm (67\")     Estimated body mass index is 28.72 kg/mý as calculated from the following:    Height as of this encounter: 170.2 cm (67\").    Weight as of this encounter: 83.2 kg (183 lb 6.4 oz).       Physical Exam  Vitals and nursing note reviewed.   Constitutional:       General: She is not in acute distress.     Appearance: Normal appearance. She is well-developed. She is not ill-appearing or diaphoretic.   Eyes:      Pupils: Pupils are equal, round, and reactive to light.   Neck:      Thyroid: No thyromegaly.      Vascular: No JVD.   Cardiovascular:      Rate and Rhythm: Normal rate and regular rhythm.      Heart sounds: Normal heart sounds. No murmur heard.  Pulmonary:      Effort: Pulmonary effort is normal. No respiratory distress.      Breath sounds: Normal breath sounds. No wheezing.   Abdominal:      General: Bowel sounds are normal. There is no " distension.      Palpations: Abdomen is soft.      Tenderness: There is no abdominal tenderness.   Musculoskeletal:         General: Swelling (trace B feet) and tenderness (chronic B hand OA, B knee L>R OA, severe alvarez rom of L knee. ) present. Normal range of motion.      Cervical back: Normal range of motion and neck supple.   Skin:     General: Skin is warm and dry.      Findings: No lesion.   Neurological:      General: No focal deficit present.      Mental Status: She is alert and oriented to person, place, and time. Mental status is at baseline.      Sensory: No sensory deficit.      Motor: No weakness.      Gait: Gait abnormal.   Psychiatric:         Mood and Affect: Mood normal.         Behavior: Behavior normal.         Thought Content: Thought content normal.         Judgment: Judgment normal.        Does the patient have evidence of cognitive impairment? Yes, memory decline      Lab Results   Component Value Date    TRIG 59 2023    HDL 78 (H) 2023     (H) 2023    VLDL 11 2023          HEALTH RISK ASSESSMENT    Smoking Status:  Social History     Tobacco Use   Smoking Status Never   Smokeless Tobacco Never     Alcohol Consumption:  Social History     Substance and Sexual Activity   Alcohol Use Never     Fall Risk Screen:    STEADI Fall Risk Assessment was completed, and patient is at LOW risk for falls.Assessment completed on:2023    Depression Screenin/29/2023     9:05 AM   PHQ-2/PHQ-9 Depression Screening   Little Interest or Pleasure in Doing Things 0-->not at all   Feeling Down, Depressed or Hopeless 0-->not at all   PHQ-9: Brief Depression Severity Measure Score 0       Health Habits and Functional and Cognitive Screenin/29/2023     9:01 AM   Functional & Cognitive Status   Do you have difficulty preparing food and eating? No   Do you have difficulty bathing yourself, getting dressed or grooming yourself? No   Do you have difficulty using the  toilet? No   Do you have difficulty moving around from place to place? Yes   Do you have trouble with steps or getting out of a bed or a chair? Yes   Current Diet Well Balanced Diet   Dental Exam Up to date   Eye Exam Up to date   Exercise (times per week) 4 times per week   Current Exercises Include Light Weights;Walking   Do you need help using the phone?  No   Are you deaf or do you have serious difficulty hearing?  No   Do you need help to go to places out of walking distance? No   Do you need help shopping? No   Do you need help preparing meals?  No   Do you need help with housework?  No   Do you need help with laundry? No   Do you need help taking your medications? No   Do you need help managing money? No   Do you ever drive or ride in a car without wearing a seat belt? No   Have you felt unusual stress, anger or loneliness in the last month? No   Who do you live with? Spouse   If you need help, do you have trouble finding someone available to you? No   Have you been bothered in the last four weeks by sexual problems? No   Do you have difficulty concentrating, remembering or making decisions? No       ATTENTION  What is the year: correct  What is the month of the year: correct  What is the day of the week?: correct  What is the date?: incorrect  MEMORY  Repeat address three times, only score third attempt: [address], [place]: 2  HOW MANY ANIMALS DID THE PATIENT NAME  Verbal Fluency -- Animal Names (0-25): 22+  CLOCK DRAWING  Clock Drawing: [place]  MEMORY RECALL  Tell me what you remember about that name and address we were repeating at the beginnin  ACE TOTAL SCORE  Total ACE Score - <25/30 strongly suggests cognitive impairment; <21/30 almost certainly shows dementia: 13      Age-appropriate Screening Schedule:  Refer to the list below for future screening recommendations based on patient's age, sex and/or medical conditions. Orders for these recommended tests are listed in  the plan section. The patient has been provided with a written plan.    Health Maintenance   Topic Date Due    COVID-19 Vaccine (6 - Pfizer series) 02/25/2023    INFLUENZA VACCINE  10/01/2023    BMI FOLLOWUP  02/27/2024    LIPID PANEL  08/22/2024    ANNUAL WELLNESS VISIT  08/29/2024    DXA SCAN  08/30/2024    TDAP/TD VACCINES (2 - Tdap) 10/27/2029    HEPATITIS C SCREENING  Completed    Pneumococcal Vaccine 65+  Completed    ZOSTER VACCINE  Completed                    CMS Preventative Services Quick Reference  Risk Factors Identified During Encounter:    Chronic Pain: Natural history and expected course discussed. Questions answered.  Immunizations Discussed/Encouraged: Influenza and COVID19    The above risks/problems have been discussed with the patient.  Pertinent information has been shared with the patient in the After Visit Summary.    Diagnoses and all orders for this visit:    1. Medicare annual wellness visit, subsequent (Primary)    2. Essential hypertension  Comments:  BP stable, Continue medications as directed  Orders:  -     amLODIPine (NORVASC) 10 MG tablet; Take 1 tablet by mouth Daily.  Dispense: 90 tablet; Refill: 1  -     hydroCHLOROthiazide (HYDRODIURIL) 25 MG tablet; Take 1 tablet by mouth Daily.  Dispense: 90 tablet; Refill: 1    3. Hypothyroidism, unspecified type    4. Hypertension, unspecified type    5. Mixed hyperlipidemia    6. Primary osteoarthritis of both knees    7. Moderate persistent asthma without complication    8. Erosive osteoarthritis of multiple sites    9. Mixed stress and urge urinary incontinence    10. Chronic hyponatremia    Other orders  -     fosinopril (MONOPRIL) 20 MG tablet; Take 1 tablet by mouth 2 (Two) Times a Day.  Dispense: 180 tablet; Refill: 1        Conditions mostly stable  Colonoscopy sched sept 27  Mammo up to date  Recommend flu shot in the fall  Shingrix and all vaccines up to date  Continue current medication regimen  Follow-up with Ortho and  rheumatology as directed  Continue gel injections, recommend discussion of left TKR  Labs reviewed, lipids slightly worse due to lack of due to knee pain  Hyponatremia, chronic and unchanged  All other labs stable  Memory decline, she reports has never been able to remember addresses. She overall does well, drives, does not forget where she is going or things/tasks at home.   Age appropriate preventative counseling provided, including healthy lifestyle modifications and exercise      Follow Up:     Return in about 6 months (around 2/29/2024) for Recheck. HTN panel prior to appt.    Next Medicare Wellness visit to be scheduled in 1 year.      An After Visit Summary and PPPS were made available to the patient.      EMR Dragon transcription disclaimer:  Part of this note may be an electronic transcription/translation of spoken language to printed text using the Dragon Dictation System.

## 2023-08-31 ENCOUNTER — OFFICE VISIT (OUTPATIENT)
Dept: ORTHOPEDIC SURGERY | Facility: CLINIC | Age: 78
End: 2023-08-31
Payer: MEDICARE

## 2023-08-31 VITALS — HEIGHT: 67 IN | HEART RATE: 65 BPM | WEIGHT: 183 LBS | OXYGEN SATURATION: 99 % | BODY MASS INDEX: 28.72 KG/M2

## 2023-08-31 DIAGNOSIS — M17.12 PRIMARY OSTEOARTHRITIS OF LEFT KNEE: Primary | ICD-10-CM

## 2023-08-31 DIAGNOSIS — M17.11 PRIMARY OSTEOARTHRITIS OF RIGHT KNEE: ICD-10-CM

## 2023-08-31 NOTE — PROGRESS NOTES
"   Patient ID: Nafisa Washington is a 77 y.o. female presents for her 3rd and last of the series of Orthovisc HA injections.      Objective:  Pulse 65   Ht 170.2 cm (67\")   Wt 83 kg (183 lb)   SpO2 99%   BMI 28.66 kg/mý     Physical Examination:       Bilateral knees demonstrate intact skin.  No warmth.    Imaging:   No new imaging    Assessment:    Diagnoses and all orders for this visit:    1. Primary osteoarthritis of left knee (Primary)    2. Primary osteoarthritis of right knee    Other orders  -     Large Joint Arthrocentesis  -     Large Joint Arthrocentesis      Plan: Third and last injection of Orthovisc administered to bilateral intra-articular knees today. Follow-up in 5 to 6 months for evaluation of efficacy of this HA injection series.  Restrictions were discussed may resume her activities as tolerated Saturday morning.  All questions answered.    Large Joint Arthrocentesis: R knee  Date/Time: 8/31/2023 11:09 AM  Consent given by: patient  Site marked: site marked  Timeout: Immediately prior to procedure a time out was called to verify the correct patient, procedure, equipment, support staff and site/side marked as required   Supporting Documentation  Indications: pain   Procedure Details  Location: knee - R knee  Preparation: Patient was prepped and draped in the usual sterile fashion  Needle size: 22 G  Approach: anterolateral  Medications administered: 30 mg Hyaluronan 30 MG/2ML  Aspirate amount: 0 mL  Patient tolerance: patient tolerated the procedure well with no immediate complications      Large Joint Arthrocentesis: L knee  Date/Time: 8/31/2023 11:50 AM  Consent given by: patient  Site marked: site marked  Timeout: Immediately prior to procedure a time out was called to verify the correct patient, procedure, equipment, support staff and site/side marked as required   Supporting Documentation  Indications: pain   Procedure Details  Location: knee - L knee  Preparation: Patient was prepped " and draped in the usual sterile fashion  Needle size: 22 G  Approach: anterolateral  Medications administered: 30 mg Hyaluronan 30 MG/2ML  Aspirate amount: 0 mL  Patient tolerance: patient tolerated the procedure well with no immediate complications        Disclaimer: Part of this note may be an electronic transcription/translation of spoken language to printed text using the Dragon Dictation System

## 2023-09-27 ENCOUNTER — ON CAMPUS - OUTPATIENT (OUTPATIENT)
Dept: URBAN - METROPOLITAN AREA HOSPITAL 2 | Facility: HOSPITAL | Age: 78
End: 2023-09-27

## 2023-09-27 ENCOUNTER — OFFICE (OUTPATIENT)
Dept: URBAN - METROPOLITAN AREA PATHOLOGY 4 | Facility: PATHOLOGY | Age: 78
End: 2023-09-27

## 2023-09-27 ENCOUNTER — OFFICE (OUTPATIENT)
Dept: URBAN - METROPOLITAN AREA PATHOLOGY 4 | Facility: PATHOLOGY | Age: 78
End: 2023-09-27
Payer: COMMERCIAL

## 2023-09-27 VITALS
HEART RATE: 60 BPM | DIASTOLIC BLOOD PRESSURE: 45 MMHG | OXYGEN SATURATION: 97 % | SYSTOLIC BLOOD PRESSURE: 109 MMHG | HEART RATE: 59 BPM | RESPIRATION RATE: 16 BRPM | DIASTOLIC BLOOD PRESSURE: 63 MMHG | SYSTOLIC BLOOD PRESSURE: 111 MMHG | HEART RATE: 58 BPM | DIASTOLIC BLOOD PRESSURE: 67 MMHG | DIASTOLIC BLOOD PRESSURE: 61 MMHG | HEART RATE: 64 BPM | HEART RATE: 62 BPM | OXYGEN SATURATION: 99 % | SYSTOLIC BLOOD PRESSURE: 134 MMHG | DIASTOLIC BLOOD PRESSURE: 51 MMHG | HEART RATE: 61 BPM | OXYGEN SATURATION: 98 % | SYSTOLIC BLOOD PRESSURE: 106 MMHG | SYSTOLIC BLOOD PRESSURE: 123 MMHG | TEMPERATURE: 98.2 F | HEIGHT: 67 IN | HEART RATE: 63 BPM | OXYGEN SATURATION: 100 % | SYSTOLIC BLOOD PRESSURE: 93 MMHG | SYSTOLIC BLOOD PRESSURE: 119 MMHG | SYSTOLIC BLOOD PRESSURE: 141 MMHG | HEART RATE: 66 BPM | SYSTOLIC BLOOD PRESSURE: 98 MMHG | DIASTOLIC BLOOD PRESSURE: 52 MMHG | DIASTOLIC BLOOD PRESSURE: 62 MMHG | DIASTOLIC BLOOD PRESSURE: 79 MMHG | RESPIRATION RATE: 18 BRPM | WEIGHT: 177 LBS | SYSTOLIC BLOOD PRESSURE: 137 MMHG

## 2023-09-27 DIAGNOSIS — D12.2 BENIGN NEOPLASM OF ASCENDING COLON: ICD-10-CM

## 2023-09-27 DIAGNOSIS — D12.0 BENIGN NEOPLASM OF CECUM: ICD-10-CM

## 2023-09-27 DIAGNOSIS — D12.3 BENIGN NEOPLASM OF TRANSVERSE COLON: ICD-10-CM

## 2023-09-27 DIAGNOSIS — Z86.010 PERSONAL HISTORY OF COLONIC POLYPS: ICD-10-CM

## 2023-09-27 DIAGNOSIS — K57.30 DIVERTICULOSIS OF LARGE INTESTINE WITHOUT PERFORATION OR ABS: ICD-10-CM

## 2023-09-27 DIAGNOSIS — Z09 ENCOUNTER FOR FOLLOW-UP EXAMINATION AFTER COMPLETED TREATMEN: ICD-10-CM

## 2023-09-27 PROBLEM — K63.5 POLYP OF COLON: Status: ACTIVE | Noted: 2023-09-27

## 2023-09-27 LAB
GI HISTOLOGY: A. UNSPECIFIED: (no result)
GI HISTOLOGY: B. UNSPECIFIED: (no result)
GI HISTOLOGY: C. UNSPECIFIED: (no result)
GI HISTOLOGY: PDF REPORT: (no result)

## 2023-09-27 PROCEDURE — 88305 TISSUE EXAM BY PATHOLOGIST: CPT | Mod: 26 | Performed by: INTERNAL MEDICINE

## 2023-09-27 PROCEDURE — 45385 COLONOSCOPY W/LESION REMOVAL: CPT | Mod: PT | Performed by: INTERNAL MEDICINE

## 2023-09-28 ENCOUNTER — FLU SHOT (OUTPATIENT)
Dept: FAMILY MEDICINE CLINIC | Facility: CLINIC | Age: 78
End: 2023-09-28
Payer: MEDICARE

## 2023-09-28 DIAGNOSIS — Z23 NEED FOR INFLUENZA VACCINATION: Primary | ICD-10-CM

## 2023-09-28 PROCEDURE — 90662 IIV NO PRSV INCREASED AG IM: CPT | Performed by: NURSE PRACTITIONER

## 2023-09-28 PROCEDURE — G0008 ADMIN INFLUENZA VIRUS VAC: HCPCS | Performed by: NURSE PRACTITIONER

## 2023-11-02 ENCOUNTER — APPOINTMENT (OUTPATIENT)
Dept: GENERAL RADIOLOGY | Facility: HOSPITAL | Age: 78
End: 2023-11-02
Payer: MEDICARE

## 2023-11-02 ENCOUNTER — HOSPITAL ENCOUNTER (OUTPATIENT)
Facility: HOSPITAL | Age: 78
Discharge: HOME | End: 2023-11-02
Attending: STUDENT IN AN ORGANIZED HEALTH CARE EDUCATION/TRAINING PROGRAM | Admitting: STUDENT IN AN ORGANIZED HEALTH CARE EDUCATION/TRAINING PROGRAM
Payer: MEDICARE

## 2023-11-02 VITALS
SYSTOLIC BLOOD PRESSURE: 156 MMHG | DIASTOLIC BLOOD PRESSURE: 57 MMHG | HEIGHT: 67 IN | TEMPERATURE: 97.7 F | BODY MASS INDEX: 27.94 KG/M2 | OXYGEN SATURATION: 96 % | RESPIRATION RATE: 18 BRPM | WEIGHT: 178 LBS | HEART RATE: 77 BPM

## 2023-11-02 DIAGNOSIS — J40 BRONCHITIS: Primary | ICD-10-CM

## 2023-11-02 LAB
FLUAV SUBTYP SPEC NAA+PROBE: NOT DETECTED
FLUBV RNA ISLT QL NAA+PROBE: NOT DETECTED
SARS-COV-2 RNA RESP QL NAA+PROBE: NOT DETECTED
STREP A PCR: NOT DETECTED

## 2023-11-02 PROCEDURE — 99214 OFFICE O/P EST MOD 30 MIN: CPT | Performed by: STUDENT IN AN ORGANIZED HEALTH CARE EDUCATION/TRAINING PROGRAM

## 2023-11-02 PROCEDURE — 87636 SARSCOV2 & INF A&B AMP PRB: CPT | Performed by: STUDENT IN AN ORGANIZED HEALTH CARE EDUCATION/TRAINING PROGRAM

## 2023-11-02 PROCEDURE — 71045 X-RAY EXAM CHEST 1 VIEW: CPT

## 2023-11-02 PROCEDURE — 87651 STREP A DNA AMP PROBE: CPT | Performed by: STUDENT IN AN ORGANIZED HEALTH CARE EDUCATION/TRAINING PROGRAM

## 2023-11-02 PROCEDURE — G0463 HOSPITAL OUTPT CLINIC VISIT: HCPCS | Performed by: STUDENT IN AN ORGANIZED HEALTH CARE EDUCATION/TRAINING PROGRAM

## 2023-11-02 RX ORDER — ALBUTEROL SULFATE 1.25 MG/3ML
1 SOLUTION RESPIRATORY (INHALATION) EVERY 6 HOURS PRN
Qty: 120 ML | Refills: 0 | Status: SHIPPED | OUTPATIENT
Start: 2023-11-02 | End: 2023-11-16

## 2023-11-02 RX ORDER — METHYLPREDNISOLONE 4 MG/1
TABLET ORAL
Qty: 21 TABLET | Refills: 0 | Status: SHIPPED | OUTPATIENT
Start: 2023-11-02

## 2023-11-02 RX ORDER — BROMPHENIRAMINE MALEATE, PSEUDOEPHEDRINE HYDROCHLORIDE, AND DEXTROMETHORPHAN HYDROBROMIDE 2; 30; 10 MG/5ML; MG/5ML; MG/5ML
5 SYRUP ORAL 4 TIMES DAILY PRN
Qty: 140 ML | Refills: 0 | Status: SHIPPED | OUTPATIENT
Start: 2023-11-02 | End: 2023-11-09

## 2023-11-02 NOTE — DISCHARGE INSTRUCTIONS
You were seen in the emergency department for a cough.  Your chest x-ray did not show any evidence of pneumonia, your COVID, flu and strep testing were negative.    You been discharged home with a steroid, refills for your nebulizers along with a cough medication    Please follow-up with your primary care physician return to the emergency department for any worsening symptoms such as any shortness of breath, chest discomfort, fever, chills or worsening cough.

## 2023-11-02 NOTE — FSED PROVIDER NOTE
Subjective   History of Present Illness  Patient is a 78-year-old female who presents to the emergency department for a cough.  Patient is a history of asthma, hypertension, hyperlipidemia, hypothyroidism.  Patient states symptoms started earlier in the week, she states her grandson is at home sick with similar symptoms and was diagnosed with strep last week.  Patient denies any sore throat, states she mostly just has a productive cough and congestion.  She has been using her albuterol nebulizers at home and does have some improvement in her symptoms.  She states cough is keeping her up at night and she has not having to sleep in her recliner.  She denies any fever, chills, sore throat, lower extremity edema, chest discomfort.  Patient is otherwise up-to-date on vaccinations, no recent travel.      Review of Systems   Constitutional:  Negative for activity change and fever.   HENT:  Positive for congestion. Negative for rhinorrhea and sore throat.    Respiratory:  Positive for cough and wheezing. Negative for shortness of breath.    Cardiovascular:  Negative for chest pain.   Gastrointestinal:  Negative for abdominal pain, nausea and vomiting.   Genitourinary:  Negative for dysuria.   Musculoskeletal:  Negative for back pain and myalgias.   Skin:  Negative for rash.   Neurological:  Negative for dizziness, light-headedness and headaches.   Psychiatric/Behavioral:  Negative for confusion.        Past Medical History:   Diagnosis Date    Asthma     Disease of thyroid gland     Hyperlipidemia     Hypertension     Macular degeneration        Allergies   Allergen Reactions    Shellfish-Derived Products Anaphylaxis       Past Surgical History:   Procedure Laterality Date    ADENOIDECTOMY      DENTAL PROCEDURE      HYSTERECTOMY      Full     TONSILLECTOMY         History reviewed. No pertinent family history.    Social History     Socioeconomic History    Marital status:    Tobacco Use    Smoking status: Never     Smokeless tobacco: Never   Vaping Use    Vaping Use: Never used   Substance and Sexual Activity    Alcohol use: Never    Drug use: Never    Sexual activity: Defer           Objective   Physical Exam  Vitals and nursing note reviewed.   Constitutional:       General: She is not in acute distress.     Appearance: Normal appearance. She is not ill-appearing.   HENT:      Head: Normocephalic and atraumatic.      Right Ear: Tympanic membrane normal.      Left Ear: Tympanic membrane normal.      Nose: Nose normal. Congestion present.      Mouth/Throat:      Mouth: Mucous membranes are moist.      Pharynx: No oropharyngeal exudate.   Eyes:      Conjunctiva/sclera: Conjunctivae normal.   Cardiovascular:      Rate and Rhythm: Normal rate and regular rhythm.      Heart sounds: No murmur heard.  Pulmonary:      Effort: Pulmonary effort is normal. No respiratory distress.      Breath sounds: No wheezing, rhonchi or rales.   Abdominal:      General: Abdomen is flat.      Palpations: Abdomen is soft.      Tenderness: There is no abdominal tenderness. There is no guarding or rebound.   Musculoskeletal:         General: No swelling or tenderness. Normal range of motion.      Cervical back: Normal range of motion and neck supple.   Skin:     General: Skin is warm and dry.      Findings: No rash.   Neurological:      General: No focal deficit present.      Mental Status: She is alert and oriented to person, place, and time.         Procedures           ED Course  ED Course as of 11/02/23 1316   Thu Nov 02, 2023   1239 XR Chest 1 View  Impression:  No acute process.   [CO]   1314 STREP A PCR: Not Detected [CO]   1314 COVID19: Not Detected [CO]   1314 Influenza A PCR: Not Detected [CO]   1314 Influenza B PCR: Not Detected [CO]      ED Course User Index  [CO] ColumbiaNorma Reese, DO                                           Medical Decision Making  Patient is a 78-year-old female presents emergency department for a cough.  On arrival she is  afebrile, hemodynamically stable in no acute distress.  Patient's physical examination she has some mild nasal congestion but otherwise no abnormal first breath sounds, no wheezing, no respiratory distress.  Differential diagnosis includes but is not limited to atypical pneumonia, pneumonia, bronchitis, COVID, flu.  Patient's chest x-ray shows no evidence of infiltrate.  Strep, COVID and flu are negative.  Will discharge home with Medrol Dosepak, cough medication and refill for home nebulizers.  Patient likely with acute bronchitis.  Patient to follow-up with her primary care physician return for any worsening symptoms.  She was discharged home in stable condition.    Problems Addressed:  Bronchitis: complicated acute illness or injury    Amount and/or Complexity of Data Reviewed  Labs:  Decision-making details documented in ED Course.  Radiology: ordered. Decision-making details documented in ED Course.    Risk  Prescription drug management.        Final diagnoses:   Bronchitis       ED Disposition  ED Disposition       ED Disposition   Discharge    Condition   Stable    Comment   --               Nicolle Ng, APRN  [address] [facility] [address]  [place] [id number] [phone]    Schedule an appointment as soon as possible for a visit in 1 week      [place] [address] [place] [address] [phone]    As needed, If symptoms worsen         Medication List        New Prescriptions      brompheniramine-pseudoephedrine-DM 30-2-10 MG/5ML syrup  Take 5 mL by mouth 4 (Four) Times a Day As Needed for Congestion or Cough for up to 7 days.            Changed      * albuterol sulfate  (90 Base) MCG/ACT inhaler  Commonly known as: PROVENTIL HFA;VENTOLIN HFA;PROAIR HFA  INHLE 2 PUFFS 4 TIMES A DAY AS NEEDED  What changed: Another medication with the same name was added. Make sure you understand how and when to take each.     * albuterol 1.25 MG/3ML nebulizer  solution  Commonly known as: ACCUNEB  Take 3 mL by nebulization Every 6 (Six) Hours As Needed for Wheezing for up to 14 days.  What changed: You were already taking a medication with the same name, and this prescription was added. Make sure you understand how and when to take each.     * methylPREDNISolone 4 MG dose pack  Commonly known as: MEDROL  Take as directed on package instructions.  What changed: Another medication with the same name was added. Make sure you understand how and when to take each.     * methylPREDNISolone 4 MG dose pack  Commonly known as: MEDROL  Take as directed on package instructions.  What changed: You were already taking a medication with the same name, and this prescription was added. Make sure you understand how and when to take each.           * This list has 4 medication(s) that are the same as other medications prescribed for you. Read the directions carefully, and ask your doctor or other care provider to review them with you.                   Where to Get Your Medications        These medications were sent to [facility]/pharmacy #[id number] - [place], [place] - [address] [address] - [phone] [place] [phone]   [address] [id number] [address]      Phone: [phone]   albuterol 1.25 MG/3ML nebulizer solution  brompheniramine-pseudoephedrine-DM 30-2-10 MG/5ML syrup  methylPREDNISolone 4 MG dose pack

## 2023-11-07 DIAGNOSIS — E78.2 MIXED HYPERLIPIDEMIA: ICD-10-CM

## 2023-11-08 RX ORDER — ATORVASTATIN CALCIUM 10 MG/1
TABLET, FILM COATED ORAL
Qty: 90 TABLET | Refills: 1 | Status: SHIPPED | OUTPATIENT
Start: 2023-11-08

## 2024-01-04 ENCOUNTER — TELEPHONE (OUTPATIENT)
Dept: ORTHOPEDIC SURGERY | Facility: CLINIC | Age: 79
End: 2024-01-04

## 2024-01-04 DIAGNOSIS — M17.11 PRIMARY OSTEOARTHRITIS OF RIGHT KNEE: ICD-10-CM

## 2024-01-04 DIAGNOSIS — M17.12 PRIMARY OSTEOARTHRITIS OF LEFT KNEE: Primary | ICD-10-CM

## 2024-01-04 DIAGNOSIS — I10 ESSENTIAL HYPERTENSION: Primary | ICD-10-CM

## 2024-01-04 DIAGNOSIS — J45.40 MODERATE PERSISTENT ASTHMA WITHOUT COMPLICATION: ICD-10-CM

## 2024-01-04 DIAGNOSIS — E03.9 HYPOTHYROIDISM, UNSPECIFIED TYPE: ICD-10-CM

## 2024-01-04 DIAGNOSIS — N39.46 MIXED STRESS AND URGE URINARY INCONTINENCE: ICD-10-CM

## 2024-01-04 DIAGNOSIS — E78.2 MIXED HYPERLIPIDEMIA: ICD-10-CM

## 2024-01-04 RX ORDER — FLUTICASONE PROPIONATE AND SALMETEROL 100; 50 UG/1; UG/1
1 POWDER RESPIRATORY (INHALATION) 2 TIMES DAILY
Qty: 60 EACH | Refills: 11 | Status: SHIPPED | OUTPATIENT
Start: 2024-01-04

## 2024-01-04 RX ORDER — FOSINOPRIL SODIUM 20 MG/1
20 TABLET ORAL 2 TIMES DAILY
Qty: 180 TABLET | Refills: 1 | Status: SHIPPED | OUTPATIENT
Start: 2024-01-04

## 2024-01-04 RX ORDER — LEVOTHYROXINE SODIUM 0.1 MG/1
100 TABLET ORAL DAILY
Qty: 90 TABLET | Refills: 1 | Status: SHIPPED | OUTPATIENT
Start: 2024-01-04

## 2024-01-04 RX ORDER — ALBUTEROL SULFATE 90 UG/1
2 AEROSOL, METERED RESPIRATORY (INHALATION) EVERY 6 HOURS PRN
Qty: 18 G | Refills: 2 | Status: SHIPPED | OUTPATIENT
Start: 2024-01-04

## 2024-01-04 RX ORDER — HYDROCHLOROTHIAZIDE 25 MG/1
25 TABLET ORAL DAILY
Qty: 90 TABLET | Refills: 1 | Status: SHIPPED | OUTPATIENT
Start: 2024-01-04

## 2024-01-04 RX ORDER — ATORVASTATIN CALCIUM 10 MG/1
10 TABLET, FILM COATED ORAL DAILY
Qty: 90 TABLET | Refills: 1 | Status: SHIPPED | OUTPATIENT
Start: 2024-01-04

## 2024-01-04 RX ORDER — AMLODIPINE BESYLATE 10 MG/1
10 TABLET ORAL DAILY
Qty: 90 TABLET | Refills: 1 | Status: SHIPPED | OUTPATIENT
Start: 2024-01-04

## 2024-01-04 RX ORDER — OXYBUTYNIN CHLORIDE 5 MG/1
5 TABLET ORAL DAILY
Qty: 90 TABLET | Refills: 1 | Status: SHIPPED | OUTPATIENT
Start: 2024-01-04

## 2024-01-04 NOTE — TELEPHONE ENCOUNTER
Patient received last dose of Orthovisc 8/31/2023. Would you like to me enter referral for Orthovisc again for both knees?

## 2024-01-04 NOTE — TELEPHONE ENCOUNTER
Provider: MORRISON    Caller: SERJIO    Relationship to Patient: SELF    Pharmacy:     Phone Number: [phone] - [phone]    Reason for Call: PT CALLING TO GET THE PROCESS STARTED FOR GEL INJ IN BOTH KNEES

## 2024-02-13 ENCOUNTER — TELEPHONE (OUTPATIENT)
Dept: ORTHOPEDIC SURGERY | Facility: CLINIC | Age: 79
End: 2024-02-13

## 2024-02-13 NOTE — TELEPHONE ENCOUNTER
Caller: Nafisa Washington    Relationship to patient: Self    Best call back number: [phone]    Patient is needing: PATIENT CALLED TO SEE IF THERE WAS AN UPDATE WITH HER GETTING GEL INJECTIONS IN BOTH KNEES. SHE CALLED ON 1/4/24 TO GET THEM ORDERED. TE SHOWS THEY WERE ORDERED BUT PATIENT HAS NOT HEARD BACK.   PLEASE ADVISE

## 2024-02-14 NOTE — TELEPHONE ENCOUNTER
Return patient call about getting Jong knee Orthovisc injection. Advised last Jong knee Orthovisc was done on 08/31/2023 and needs to be 6 month 1 day before next can be done. Advised will get auth for injections and then call patient to Counts include [facility] beds at the [facility]ed.

## 2024-02-16 DIAGNOSIS — I10 ESSENTIAL HYPERTENSION: Primary | ICD-10-CM

## 2024-02-16 DIAGNOSIS — E78.2 MIXED HYPERLIPIDEMIA: ICD-10-CM

## 2024-02-22 ENCOUNTER — CLINICAL SUPPORT (OUTPATIENT)
Dept: FAMILY MEDICINE CLINIC | Facility: CLINIC | Age: 79
End: 2024-02-22
Payer: MEDICARE

## 2024-02-22 DIAGNOSIS — I10 ESSENTIAL HYPERTENSION: ICD-10-CM

## 2024-02-22 DIAGNOSIS — N39.46 MIXED STRESS AND URGE URINARY INCONTINENCE: ICD-10-CM

## 2024-02-22 DIAGNOSIS — I10 HYPERTENSION, UNSPECIFIED TYPE: ICD-10-CM

## 2024-02-22 DIAGNOSIS — E03.9 HYPOTHYROIDISM, UNSPECIFIED TYPE: Primary | ICD-10-CM

## 2024-02-22 DIAGNOSIS — E03.9 HYPOTHYROIDISM, UNSPECIFIED TYPE: ICD-10-CM

## 2024-02-22 LAB
DEPRECATED RDW RBC AUTO: 42.7 FL (ref 37–54)
ERYTHROCYTE [DISTWIDTH] IN BLOOD BY AUTOMATED COUNT: 13.1 % (ref 12.3–15.4)
HCT VFR BLD AUTO: 42.6 % (ref 34–46.6)
HGB BLD-MCNC: 14.1 G/DL (ref 12–15.9)
MCH RBC QN AUTO: 29.9 PG (ref 26.6–33)
MCHC RBC AUTO-ENTMCNC: 33.1 G/DL (ref 31.5–35.7)
MCV RBC AUTO: 90.3 FL (ref 79–97)
PLATELET # BLD AUTO: 304 10*3/MM3 (ref 140–450)
PMV BLD AUTO: 10 FL (ref 6–12)
RBC # BLD AUTO: 4.72 10*6/MM3 (ref 3.77–5.28)
WBC NRBC COR # BLD AUTO: 6.28 10*3/MM3 (ref 3.4–10.8)

## 2024-02-22 PROCEDURE — 85027 COMPLETE CBC AUTOMATED: CPT | Performed by: NURSE PRACTITIONER

## 2024-02-22 PROCEDURE — 80053 COMPREHEN METABOLIC PANEL: CPT | Performed by: NURSE PRACTITIONER

## 2024-02-22 PROCEDURE — 80061 LIPID PANEL: CPT | Performed by: NURSE PRACTITIONER

## 2024-02-22 PROCEDURE — 84443 ASSAY THYROID STIM HORMONE: CPT | Performed by: NURSE PRACTITIONER

## 2024-02-22 PROCEDURE — 36415 COLL VENOUS BLD VENIPUNCTURE: CPT | Performed by: NURSE PRACTITIONER

## 2024-02-22 RX ORDER — OXYBUTYNIN CHLORIDE 5 MG/1
5 TABLET ORAL DAILY
Qty: 90 TABLET | Refills: 1 | OUTPATIENT
Start: 2024-02-22

## 2024-02-22 RX ORDER — LEVOTHYROXINE SODIUM 0.1 MG/1
100 TABLET ORAL DAILY
Qty: 90 TABLET | Refills: 1 | OUTPATIENT
Start: 2024-02-22

## 2024-02-22 RX ORDER — AMLODIPINE BESYLATE 10 MG/1
10 TABLET ORAL DAILY
Qty: 90 TABLET | Refills: 1 | OUTPATIENT
Start: 2024-02-22

## 2024-02-22 NOTE — PROGRESS NOTES
Venipuncture Blood Specimen Collection  Venipuncture performed in the right arm by Jewels Cross MA with good hemostasis. Patient tolerated the procedure well without complications.   02/22/24   Jewels Cross MA

## 2024-02-23 LAB
ALBUMIN SERPL-MCNC: 5 G/DL (ref 3.5–5.2)
ALBUMIN/GLOB SERPL: 2.3 G/DL
ALP SERPL-CCNC: 64 U/L (ref 39–117)
ALT SERPL W P-5'-P-CCNC: 26 U/L (ref 1–33)
ANION GAP SERPL CALCULATED.3IONS-SCNC: 12 MMOL/L (ref 5–15)
AST SERPL-CCNC: 36 U/L (ref 1–32)
BILIRUB SERPL-MCNC: 0.7 MG/DL (ref 0–1.2)
BUN SERPL-MCNC: 8 MG/DL (ref 8–23)
BUN/CREAT SERPL: 11.6 (ref 7–25)
CALCIUM SPEC-SCNC: 10 MG/DL (ref 8.6–10.5)
CHLORIDE SERPL-SCNC: 91 MMOL/L (ref 98–107)
CHOLEST SERPL-MCNC: 227 MG/DL (ref 0–200)
CO2 SERPL-SCNC: 28 MMOL/L (ref 22–29)
CREAT SERPL-MCNC: 0.69 MG/DL (ref 0.57–1)
EGFRCR SERPLBLD CKD-EPI 2021: 89.5 ML/MIN/1.73
GLOBULIN UR ELPH-MCNC: 2.2 GM/DL
GLUCOSE SERPL-MCNC: 98 MG/DL (ref 65–99)
HDLC SERPL-MCNC: 78 MG/DL (ref 40–60)
LDLC SERPL CALC-MCNC: 136 MG/DL (ref 0–100)
LDLC/HDLC SERPL: 1.71 {RATIO}
POTASSIUM SERPL-SCNC: 4.7 MMOL/L (ref 3.5–5.2)
PROT SERPL-MCNC: 7.2 G/DL (ref 6–8.5)
SODIUM SERPL-SCNC: 131 MMOL/L (ref 136–145)
TRIGL SERPL-MCNC: 77 MG/DL (ref 0–150)
TSH SERPL DL<=0.05 MIU/L-ACNC: 1.47 UIU/ML (ref 0.27–4.2)
VLDLC SERPL-MCNC: 13 MG/DL (ref 5–40)

## 2024-03-05 DIAGNOSIS — I10 ESSENTIAL HYPERTENSION: ICD-10-CM

## 2024-03-05 RX ORDER — FOSINOPRIL SODIUM 20 MG/1
20 TABLET ORAL 2 TIMES DAILY
Qty: 180 TABLET | Refills: 1 | Status: SHIPPED | OUTPATIENT
Start: 2024-03-05

## 2024-03-07 ENCOUNTER — OFFICE VISIT (OUTPATIENT)
Dept: ORTHOPEDIC SURGERY | Facility: CLINIC | Age: 79
End: 2024-03-07
Payer: MEDICARE

## 2024-03-07 VITALS — BODY MASS INDEX: 27.94 KG/M2 | WEIGHT: 178 LBS | HEART RATE: 70 BPM | HEIGHT: 67 IN | OXYGEN SATURATION: 99 %

## 2024-03-07 DIAGNOSIS — M17.12 PRIMARY OSTEOARTHRITIS OF LEFT KNEE: ICD-10-CM

## 2024-03-07 DIAGNOSIS — M17.11 PRIMARY OSTEOARTHRITIS OF RIGHT KNEE: Primary | ICD-10-CM

## 2024-03-07 NOTE — PROGRESS NOTES
"   Patient ID: Nafisa Washington is a 78 y.o. female presents for bilateral knee Orthovisc injection for treatment of known osteoarthritis of bilateral knees.  Reports good benefit with the Orthovisc HA injections in the past.  She does admit that she is planning for a left knee replacement in the near future due to additional troubles it is causing her in her daily life.    Objective:  Pulse 70   Ht 170.2 cm (67\")   Wt 80.7 kg (178 lb)   SpO2 99%   BMI 27.88 kg/m²     Physical Examination:    Right knee:  Intact skin.  Trace effusion.  Mild warmth  Extension 0, flexion 120    Left knee:  Intact skin.  Mild effusion.  Warmth  Extension 1, flexion 115    Imaging:   No new imaging    Assessment:    Diagnoses and all orders for this visit:    1. Primary osteoarthritis of right knee (Primary)    2. Primary osteoarthritis of left knee    Other orders  -     Large Joint Arthrocentesis  -     Large Joint Arthrocentesis    Plan: Restrictions discussed.  May resume activity as tolerated on Saturday afternoon.  Follow-up in 6 months for further evaluation of efficacy.        Large Joint Arthrocentesis: R knee  Date/Time: 3/7/2024 10:02 AM  Consent given by: patient  Site marked: site marked  Timeout: Immediately prior to procedure a time out was called to verify the correct patient, procedure, equipment, support staff and site/side marked as required   Supporting Documentation  Indications: pain   Procedure Details  Location: knee - R knee  Preparation: Patient was prepped and draped in the usual sterile fashion  Needle size: 22 G  Approach: anterolateral  Medications administered: 30 mg Hyaluronan 30 MG/2ML  Aspirate amount: 0 mL  Patient tolerance: patient tolerated the procedure well with no immediate complications      Large Joint Arthrocentesis: L knee  Date/Time: 3/7/2024 10:11 AM  Consent given by: patient  Site marked: site marked  Timeout: Immediately prior to procedure a time out was called to verify the " correct patient, procedure, equipment, support staff and site/side marked as required   Supporting Documentation  Indications: pain   Procedure Details  Location: knee - L knee  Preparation: Patient was prepped and draped in the usual sterile fashion  Needle size: 22 G  Approach: anterolateral  Medications administered: 30 mg Hyaluronan 30 MG/2ML  Aspirate amount: 0 mL  Patient tolerance: patient tolerated the procedure well with no immediate complications      Disclaimer: Part of this note may be an electronic transcription/translation of spoken language to printed text using the Dragon Dictation System

## 2024-03-14 ENCOUNTER — PRIOR AUTHORIZATION (OUTPATIENT)
Dept: FAMILY MEDICINE CLINIC | Facility: CLINIC | Age: 79
End: 2024-03-14
Payer: MEDICARE

## 2024-03-14 ENCOUNTER — OFFICE VISIT (OUTPATIENT)
Dept: ORTHOPEDIC SURGERY | Facility: CLINIC | Age: 79
End: 2024-03-14
Payer: MEDICARE

## 2024-03-14 VITALS — WEIGHT: 178 LBS | RESPIRATION RATE: 20 BRPM | OXYGEN SATURATION: 97 % | HEIGHT: 67 IN | BODY MASS INDEX: 27.94 KG/M2

## 2024-03-14 DIAGNOSIS — M17.11 PRIMARY OSTEOARTHRITIS OF RIGHT KNEE: Primary | ICD-10-CM

## 2024-03-14 DIAGNOSIS — M17.12 PRIMARY OSTEOARTHRITIS OF LEFT KNEE: ICD-10-CM

## 2024-03-14 RX ORDER — ALBUTEROL SULFATE 90 UG/1
2 AEROSOL, METERED RESPIRATORY (INHALATION) EVERY 4 HOURS PRN
Qty: 18 G | Refills: 5 | Status: SHIPPED | OUTPATIENT
Start: 2024-03-14

## 2024-03-14 NOTE — TELEPHONE ENCOUNTER
Key: [id number]      Your demographic data has been sent to [employer].    The following alternatives are the preferred alternatives: Albuterol HFA (Proair) or Albuterol HFA (Proventil) or Ventolin HFA, Levalbuterol tartrate HFA. Would you like to switch to the provided preferred alternatives? Please note: Brand Ventolin HFA is the preferred covered alternative and will process at a lower tier without the need of a Prior Authorization.*

## 2024-03-14 NOTE — PROGRESS NOTES
"   Patient ID: Nafisa Washington is a 78 y.o. female presents for bilateral knee #2 Orthovisc injection.  She states she is experiencing improvement in ambulation, decreased pain and decreased crepitation with range of motion of the knee since her last injection last week.    Objective:  Resp 20   Ht 170.2 cm (67\")   Wt 80.7 kg (178 lb)   SpO2 97%   BMI 27.88 kg/m²     Physical Examination:    Bilateral knees demonstrate intact skin.  MicroMark along the anterolateral aspect from last week's injection.  Mild warmth.  Trace effusion.  Extension 0, flexion 105+    Imaging:   No new imaging.     Assessment:    Diagnoses and all orders for this visit:    1. Primary osteoarthritis of right knee (Primary)    2. Primary osteoarthritis of left knee    Other orders  -     Large Joint Arthrocentesis  -     Large Joint Arthrocentesis    Plan: Restrictions discussed.  May resume activity as tolerated at noon Saturday, 3/16/2024.    Follow-up next week for her #3 and final Orthovisc series injection.  All questions answered.    Large Joint Arthrocentesis: R knee  Date/Time: 3/14/2024 10:24 AM  Consent given by: patient  Site marked: site marked  Timeout: Immediately prior to procedure a time out was called to verify the correct patient, procedure, equipment, support staff and site/side marked as required   Supporting Documentation  Indications: pain   Procedure Details  Location: knee - R knee  Preparation: Patient was prepped and draped in the usual sterile fashion  Needle size: 22 G  Approach: anterolateral  Medications administered: 30 mg Hyaluronan 30 MG/2ML  Aspirate amount: 0 mL  Patient tolerance: patient tolerated the procedure well with no immediate complications      Large Joint Arthrocentesis: L knee  Date/Time: 3/14/2024 10:31 AM  Consent given by: patient  Site marked: site marked  Timeout: Immediately prior to procedure a time out was called to verify the correct patient, procedure, equipment, support staff " and site/side marked as required   Supporting Documentation  Indications: pain   Procedure Details  Location: knee - L knee  Preparation: Patient was prepped and draped in the usual sterile fashion  Needle size: 22 G  Approach: anterolateral  Medications administered: 30 mg Hyaluronan 30 MG/2ML  Aspirate amount: 0 mL  Patient tolerance: patient tolerated the procedure well with no immediate complications      Disclaimer: Part of this note may be an electronic transcription/translation of spoken language to printed text using the Dragon Dictation System

## 2024-03-21 ENCOUNTER — OFFICE VISIT (OUTPATIENT)
Dept: ORTHOPEDIC SURGERY | Facility: CLINIC | Age: 79
End: 2024-03-21
Payer: MEDICARE

## 2024-03-21 VITALS — WEIGHT: 178 LBS | HEART RATE: 73 BPM | BODY MASS INDEX: 27.94 KG/M2 | OXYGEN SATURATION: 96 % | HEIGHT: 67 IN

## 2024-03-21 DIAGNOSIS — M17.12 PRIMARY OSTEOARTHRITIS OF LEFT KNEE: ICD-10-CM

## 2024-03-21 DIAGNOSIS — M17.11 PRIMARY OSTEOARTHRITIS OF RIGHT KNEE: Primary | ICD-10-CM

## 2024-03-21 NOTE — PROGRESS NOTES
"   Patient ID: Nafisa Washington is a 78 y.o. female since for her #3 final Orthovisc injection to bilateral knees for treatment of her known osteoarthritis.  She reports that his first 2 injections have provided good relief of pain and decreased crepitation with flexion/extension.  Repeat discussed today her potential future referral to Dr. Lopez for possible left TKA.    Objective:  Pulse 73   Ht 170.2 cm (67\")   Wt 80.7 kg (178 lb)   SpO2 96%   BMI 27.88 kg/m²     Physical Examination:    Right knee: Intact skin.  No signs of infection or effusion.  No warmth.  Extension 0, flexion 100+      Left knee: Intact skin.  No signs of infection trace effusion mild warmth.  Extension 0, flexion 100+    Imaging:   N/A    Assessment:    Diagnoses and all orders for this visit:    1. Primary osteoarthritis of right knee (Primary)    2. Primary osteoarthritis of left knee    Other orders  -     Large Joint Arthrocentesis  -     Large Joint Arthrocentesis    Plan: Restrictions discussed.  May resume activity as tolerated on Saturday morning.  Follow-up in 6 months for further evaluation and review.     Large Joint Arthrocentesis: R knee  Date/Time: 3/21/2024 10:11 AM  Consent given by: patient  Site marked: site marked  Timeout: Immediately prior to procedure a time out was called to verify the correct patient, procedure, equipment, support staff and site/side marked as required   Supporting Documentation  Indications: pain   Procedure Details  Location: knee - R knee  Preparation: Patient was prepped and draped in the usual sterile fashion  Needle size: 22 G  Approach: anterolateral  Medications administered: 30 mg Hyaluronan 30 MG/2ML  Aspirate amount: 0 mL  Patient tolerance: patient tolerated the procedure well with no immediate complications      Large Joint Arthrocentesis: L knee  Date/Time: 3/21/2024 10:16 AM  Consent given by: patient  Site marked: site marked  Timeout: Immediately prior to procedure a time out " was called to verify the correct patient, procedure, equipment, support staff and site/side marked as required   Supporting Documentation  Indications: pain   Procedure Details  Location: knee - L knee  Preparation: Patient was prepped and draped in the usual sterile fashion  Needle size: 20 G  Approach: anterolateral  Medications administered: 30 mg Hyaluronan 30 MG/2ML  Aspirate amount: 0 mL  Patient tolerance: patient tolerated the procedure well with no immediate complications      Disclaimer: Part of this note may be an electronic transcription/translation of spoken language to printed text using the Dragon Dictation System

## 2024-04-04 DIAGNOSIS — E03.9 HYPOTHYROIDISM, UNSPECIFIED TYPE: ICD-10-CM

## 2024-04-04 DIAGNOSIS — I10 ESSENTIAL HYPERTENSION: ICD-10-CM

## 2024-04-04 RX ORDER — AMLODIPINE BESYLATE 10 MG/1
10 TABLET ORAL DAILY
Qty: 90 TABLET | Refills: 1 | Status: SHIPPED | OUTPATIENT
Start: 2024-04-04

## 2024-04-04 RX ORDER — LEVOTHYROXINE SODIUM 0.1 MG/1
100 TABLET ORAL DAILY
Qty: 90 TABLET | Refills: 1 | Status: SHIPPED | OUTPATIENT
Start: 2024-04-04

## 2024-04-06 DIAGNOSIS — N39.46 MIXED STRESS AND URGE URINARY INCONTINENCE: ICD-10-CM

## 2024-04-08 ENCOUNTER — OFFICE VISIT (OUTPATIENT)
Dept: FAMILY MEDICINE CLINIC | Facility: CLINIC | Age: 79
End: 2024-04-08
Payer: MEDICARE

## 2024-04-08 VITALS
BODY MASS INDEX: 28.56 KG/M2 | SYSTOLIC BLOOD PRESSURE: 152 MMHG | DIASTOLIC BLOOD PRESSURE: 64 MMHG | WEIGHT: 182 LBS | OXYGEN SATURATION: 99 % | HEIGHT: 67 IN | HEART RATE: 61 BPM | TEMPERATURE: 98.4 F

## 2024-04-08 DIAGNOSIS — E89.0 POSTABLATIVE HYPOTHYROIDISM: ICD-10-CM

## 2024-04-08 DIAGNOSIS — I10 ESSENTIAL HYPERTENSION: ICD-10-CM

## 2024-04-08 DIAGNOSIS — E78.2 MIXED HYPERLIPIDEMIA: ICD-10-CM

## 2024-04-08 DIAGNOSIS — N39.46 MIXED STRESS AND URGE URINARY INCONTINENCE: ICD-10-CM

## 2024-04-08 DIAGNOSIS — E87.1 CHRONIC HYPONATREMIA: ICD-10-CM

## 2024-04-08 DIAGNOSIS — M15.4 EROSIVE OSTEOARTHRITIS OF MULTIPLE SITES: ICD-10-CM

## 2024-04-08 DIAGNOSIS — J45.40 MODERATE PERSISTENT ASTHMA WITHOUT COMPLICATION: Primary | ICD-10-CM

## 2024-04-08 PROCEDURE — 1159F MED LIST DOCD IN RCRD: CPT | Performed by: NURSE PRACTITIONER

## 2024-04-08 PROCEDURE — 1160F RVW MEDS BY RX/DR IN RCRD: CPT | Performed by: NURSE PRACTITIONER

## 2024-04-08 PROCEDURE — 99214 OFFICE O/P EST MOD 30 MIN: CPT | Performed by: NURSE PRACTITIONER

## 2024-04-08 PROCEDURE — 3077F SYST BP >= 140 MM HG: CPT | Performed by: NURSE PRACTITIONER

## 2024-04-08 PROCEDURE — 3078F DIAST BP <80 MM HG: CPT | Performed by: NURSE PRACTITIONER

## 2024-04-08 RX ORDER — FLUTICASONE PROPIONATE AND SALMETEROL XINAFOATE 115; 21 UG/1; UG/1
2 AEROSOL, METERED RESPIRATORY (INHALATION)
Qty: 12 G | Refills: 11 | Status: SHIPPED | OUTPATIENT
Start: 2024-04-08

## 2024-04-08 RX ORDER — OXYBUTYNIN CHLORIDE 5 MG/1
5 TABLET ORAL DAILY
Qty: 90 TABLET | Refills: 1 | Status: SHIPPED | OUTPATIENT
Start: 2024-04-08

## 2024-04-08 NOTE — PROGRESS NOTES
"Chief Complaint  Chief Complaint   Patient presents with    Hypertension     6 MONTH FOLLOW UP            Subjective          Nafisa Washington presents to [facility] for   History of Present Illness    Hypothyroidism, with history of thyroid ablation.  TSH has been stable, patient on levothyroxine 100. She has a sister with history of thyroid cancer, pt had normal thyroid ultrasound 2022.  Denies symptoms of constipation, hot or cold intolerance, hair loss, abnormal heart rate and fatigue.     HTN, stable on meds and takes as directed, denies chest pain, headache, shortness of air, palpitations and swelling of extremities.     Hyperlipidemia, The patient denies muscle aches, constipation, diarrhea, GI upset, fatigue, chest pain/pressure, exercise intolerance, dyspnea, palpitations, syncope and pedal edema.       Family history of breast cancer, she completes mammograms every year, up-to-date and normal in February     Erosive osteoarthritis of multiple joints, osteoporosis, follows with rheumatology on sulfasalazine and Reclast.    -DEXA up to date     Primary osteoarthritis of left >right knee, she is following with Dr. Eric Marcus and receiving injections to both knees. She is discussing possible left TKR with Dr. Lozada in future. She is working on weight loss and is unable to be as active as she wants d/t the pain     Asthma, Advair diskus unavailable, she only has 3 days left, she is using albuterol if needed.      Urinary urge/stress incontinence, improved on oxybutynin     Colonoscopy up-to-date September 2023, polyps removed, recommended recall 3 years     Macular degeneration, seeing Dr. Isaac in [place].      She reports being under tremendous stress last visit d/t her sisters husbands death, acemini was low and she reported being distracted, \"couldn't think straight.\" She denies any current memory problems and stress has lessened.       The following portions of " the patient's history were reviewed and updated as appropriate: allergies, current medications, past family history, past medical history, past social history, past surgical history and problem list.    Past Medical History:   Diagnosis Date    Asthma     Disease of thyroid gland     Hyperlipidemia     Hypertension     Knee swelling 5 years ago    Macular degeneration      Past Surgical History:   Procedure Laterality Date    ADENOIDECTOMY      DENTAL PROCEDURE      HYSTERECTOMY      Full     TONSILLECTOMY      TRIGGER POINT INJECTION  5 years ago    bad knees- gel injections     History reviewed. No pertinent family history.  Social History     Tobacco Use    Smoking status: Never     Passive exposure: Never    Smokeless tobacco: Never   Substance Use Topics    Alcohol use: Never       Current Outpatient Medications:     amLODIPine (NORVASC) 10 MG tablet, TAKE 1 TABLET BY MOUTH DAILY, Disp: 90 tablet, Rfl: 1    atorvastatin (LIPITOR) 10 MG tablet, Take 1 tablet by mouth Daily., Disp: 90 tablet, Rfl: 1    cholecalciferol (VITAMIN D3) 1000 units tablet, Take 1 tablet by mouth Daily., Disp: , Rfl:     Ferrous Sulfate Dried  (50 Fe) MG tablet controlled-release, Take  by mouth., Disp: , Rfl:     fosinopril (MONOPRIL) 20 MG tablet, Take 1 tablet by mouth 2 (Two) Times a Day., Disp: 180 tablet, Rfl: 1    hydroCHLOROthiazide (HYDRODIURIL) 25 MG tablet, Take 1 tablet by mouth Daily., Disp: 90 tablet, Rfl: 1    levothyroxine (SYNTHROID, LEVOTHROID) 100 MCG tablet, TAKE 1 TABLET BY MOUTH DAILY, Disp: 90 tablet, Rfl: 1    Multiple Vitamin (MULTI-VITAMIN DAILY PO), Take  by mouth., Disp: , Rfl:     multivitamins-minerals (PRESERVISION AREDS 2) capsule capsule, Take 1 capsule by mouth 2 (Two) Times a Day., Disp: , Rfl:     oxybutynin (DITROPAN) 5 MG tablet, Take 1 tablet by mouth Daily., Disp: 90 tablet, Rfl: 1    sorbitol 70 % solution solution, TAKE 100 ML BY MOUTH AS DIRECTED FOR 1 DAY, Disp: , Rfl:     sulfaSALAzine  "(AZULFIDINE) 500 MG tablet, Take 1 tablet by mouth 2 (Two) Times a Day., Disp: , Rfl:     Ventolin  (90 Base) MCG/ACT inhaler, Inhale 2 puffs Every 4 (Four) Hours As Needed for Wheezing., Disp: 18 g, Rfl: 5    zoledronic acid (RECLAST) 5 MG/100ML solution infusion, Infuse 100 mL into a venous catheter 1 (One) Time., Disp: , Rfl:     fluticasone-salmeterol (Advair HFA) 115-21 MCG/ACT inhaler, Inhale 2 puffs 2 (Two) Times a Day., Disp: 12 g, Rfl: 11    Objective   Vital Signs:   /64   Pulse 61   Temp 98.4 °F (36.9 °C) (Infrared)   Ht 170.2 cm (67.01\")   Wt 82.6 kg (182 lb)   SpO2 99%   BMI 28.50 kg/m²           Physical Exam  Constitutional:       General: She is not in acute distress.     Appearance: Normal appearance. She is well-developed. She is not ill-appearing or diaphoretic.   HENT:      Head: Normocephalic.   Eyes:      Conjunctiva/sclera: Conjunctivae normal.      Pupils: Pupils are equal, round, and reactive to light.   Neck:      Thyroid: No thyromegaly.      Vascular: No JVD.   Cardiovascular:      Rate and Rhythm: Normal rate and regular rhythm.      Heart sounds: Normal heart sounds. No murmur heard.  Pulmonary:      Effort: Pulmonary effort is normal. No respiratory distress.      Breath sounds: Normal breath sounds. No wheezing or rhonchi.   Abdominal:      General: Bowel sounds are normal. There is no distension.      Palpations: Abdomen is soft.      Tenderness: There is no abdominal tenderness.   Musculoskeletal:         General: Tenderness (multi joint OA, L>R knee mod alvarez rom) present. No swelling. Normal range of motion.      Cervical back: Normal range of motion and neck supple. No tenderness.   Lymphadenopathy:      Cervical: No cervical adenopathy.   Skin:     General: Skin is warm and dry.      Coloration: Skin is not jaundiced.      Findings: No erythema or rash.   Neurological:      General: No focal deficit present.      Mental Status: She is alert and oriented to " person, place, and time. Mental status is at baseline.      Sensory: No sensory deficit.      Motor: No weakness.      Gait: Gait abnormal.   Psychiatric:         Mood and Affect: Mood normal.         Behavior: Behavior normal.         Thought Content: Thought content normal.         Judgment: Judgment normal.          Result Review :     No visits with results within 7 Day(s) from this visit.   Latest known visit with results is:   Orders Only on 02/16/2024   Component Date Value Ref Range Status    Total Cholesterol 02/22/2024 227 (H)  0 - 200 mg/dL Final    Triglycerides 02/22/2024 77  0 - 150 mg/dL Final    HDL Cholesterol 02/22/2024 78 (H)  40 - 60 mg/dL Final    LDL Cholesterol  02/22/2024 136 (H)  0 - 100 mg/dL Final    VLDL Cholesterol 02/22/2024 13  5 - 40 mg/dL Final    LDL/HDL Ratio 02/22/2024 1.71   Final    Glucose 02/22/2024 98  65 - 99 mg/dL Final    BUN 02/22/2024 8  8 - 23 mg/dL Final    Creatinine 02/22/2024 0.69  0.57 - 1.00 mg/dL Final    Sodium 02/22/2024 131 (L)  136 - 145 mmol/L Final    Potassium 02/22/2024 4.7  3.5 - 5.2 mmol/L Final    Chloride 02/22/2024 91 (L)  98 - 107 mmol/L Final    CO2 02/22/2024 28.0  22.0 - 29.0 mmol/L Final    Calcium 02/22/2024 10.0  8.6 - 10.5 mg/dL Final    Total Protein 02/22/2024 7.2  6.0 - 8.5 g/dL Final    Albumin 02/22/2024 5.0  3.5 - 5.2 g/dL Final    ALT (SGPT) 02/22/2024 26  1 - 33 U/L Final    AST (SGOT) 02/22/2024 36 (H)  1 - 32 U/L Final    Alkaline Phosphatase 02/22/2024 64  39 - 117 U/L Final    Total Bilirubin 02/22/2024 0.7  0.0 - 1.2 mg/dL Final    Globulin 02/22/2024 2.2  gm/dL Final    A/G Ratio 02/22/2024 2.3  g/dL Final    BUN/Creatinine Ratio 02/22/2024 11.6  7.0 - 25.0 Final    Anion Gap 02/22/2024 12.0  5.0 - 15.0 mmol/L Final    eGFR 02/22/2024 89.5  >60.0 mL/min/1.73 Final    WBC 02/22/2024 6.28  3.40 - 10.80 10*3/mm3 Final    RBC 02/22/2024 4.72  3.77 - 5.28 10*6/mm3 Final    Hemoglobin 02/22/2024 14.1  12.0 - 15.9 g/dL Final     Hematocrit 02/22/2024 42.6  34.0 - 46.6 % Final    MCV 02/22/2024 90.3  79.0 - 97.0 fL Final    MCH 02/22/2024 29.9  26.6 - 33.0 pg Final    MCHC 02/22/2024 33.1  31.5 - 35.7 g/dL Final    RDW 02/22/2024 13.1  12.3 - 15.4 % Final    RDW-SD 02/22/2024 42.7  37.0 - 54.0 fl Final    MPV 02/22/2024 10.0  6.0 - 12.0 fL Final    Platelets 02/22/2024 304  140 - 450 10*3/mm3 Final    TSH 02/22/2024 1.470  0.270 - 4.200 uIU/mL Final                            Assessment and Plan    Diagnoses and all orders for this visit:    1. Moderate persistent asthma without complication (Primary)  -     fluticasone-salmeterol (Advair HFA) 115-21 MCG/ACT inhaler; Inhale 2 puffs 2 (Two) Times a Day.  Dispense: 12 g; Refill: 11    2. Postablative hypothyroidism    3. Mixed hyperlipidemia    4. Mixed stress and urge urinary incontinence    5. Essential hypertension    6. Chronic hyponatremia    7. Erosive osteoarthritis of multiple sites        Labs reviewed with patient  Mild elevated AST, will monitor  Add salt to diet  All other labs stable  Mammogram and colonoscopy up to date  Cont current med regimen  Ordering advair HFA in place of diskus, cont ventolin prn  F/u ortho as needed for injections      I spent 30 minutes caring for Nafisa Washington on this date of service. This time includes time spent by me in the following activities: preparing for the visit, reviewing tests, performing a medically appropriate examination and/or evaluation , counseling and educating the patient/family/caregiver, ordering medications, tests, or procedures and documenting information in the medical record        Follow Up     Return in about 6 months (around 10/8/2024) for Recheck, Medicare Wellness. HTN panel prior to appt.  Patient was given instructions and counseling regarding her condition or for health maintenance advice. Please see specific information pulled into the AVS if appropriate.        Part of this note may be an electronic  transcription/translation of spoken language to printed text using the Dragon Dictation System

## 2024-04-09 DIAGNOSIS — I10 ESSENTIAL HYPERTENSION: ICD-10-CM

## 2024-04-09 RX ORDER — FOSINOPRIL SODIUM 20 MG/1
20 TABLET ORAL 2 TIMES DAILY
Qty: 180 TABLET | Refills: 1 | OUTPATIENT
Start: 2024-04-09

## 2024-04-10 DIAGNOSIS — I10 ESSENTIAL HYPERTENSION: ICD-10-CM

## 2024-04-12 RX ORDER — HYDROCHLOROTHIAZIDE 25 MG/1
25 TABLET ORAL DAILY
Qty: 90 TABLET | Refills: 1 | Status: SHIPPED | OUTPATIENT
Start: 2024-04-12

## 2024-04-16 DIAGNOSIS — E78.2 MIXED HYPERLIPIDEMIA: ICD-10-CM

## 2024-04-16 RX ORDER — ATORVASTATIN CALCIUM 10 MG/1
10 TABLET, FILM COATED ORAL DAILY
Qty: 90 TABLET | Refills: 1 | Status: SHIPPED | OUTPATIENT
Start: 2024-04-16

## 2024-06-24 RX ORDER — ALBUTEROL SULFATE 90 UG/1
2 AEROSOL, METERED RESPIRATORY (INHALATION) EVERY 4 HOURS PRN
Qty: 18 G | Refills: 5 | Status: SHIPPED | OUTPATIENT
Start: 2024-06-24

## 2024-08-08 DIAGNOSIS — N39.46 MIXED STRESS AND URGE URINARY INCONTINENCE: ICD-10-CM

## 2024-08-08 DIAGNOSIS — I10 ESSENTIAL HYPERTENSION: ICD-10-CM

## 2024-08-08 DIAGNOSIS — E03.9 HYPOTHYROIDISM, UNSPECIFIED TYPE: ICD-10-CM

## 2024-08-08 DIAGNOSIS — J45.40 MODERATE PERSISTENT ASTHMA WITHOUT COMPLICATION: ICD-10-CM

## 2024-08-08 DIAGNOSIS — E78.2 MIXED HYPERLIPIDEMIA: ICD-10-CM

## 2024-08-09 RX ORDER — FOSINOPRIL SODIUM 20 MG/1
20 TABLET ORAL 2 TIMES DAILY
Qty: 180 TABLET | Refills: 1 | Status: SHIPPED | OUTPATIENT
Start: 2024-08-09

## 2024-08-09 RX ORDER — FLUTICASONE PROPIONATE AND SALMETEROL XINAFOATE 115; 21 UG/1; UG/1
2 AEROSOL, METERED RESPIRATORY (INHALATION)
Qty: 12 G | Refills: 11 | Status: SHIPPED | OUTPATIENT
Start: 2024-08-09

## 2024-08-09 RX ORDER — ATORVASTATIN CALCIUM 10 MG/1
10 TABLET, FILM COATED ORAL DAILY
Qty: 90 TABLET | Refills: 1 | Status: SHIPPED | OUTPATIENT
Start: 2024-08-09

## 2024-08-09 RX ORDER — AMLODIPINE BESYLATE 10 MG/1
10 TABLET ORAL DAILY
Qty: 90 TABLET | Refills: 1 | Status: SHIPPED | OUTPATIENT
Start: 2024-08-09

## 2024-08-09 RX ORDER — ALBUTEROL SULFATE 90 UG/1
2 AEROSOL, METERED RESPIRATORY (INHALATION) EVERY 4 HOURS PRN
Qty: 18 G | Refills: 5 | Status: SHIPPED | OUTPATIENT
Start: 2024-08-09

## 2024-08-09 RX ORDER — LEVOTHYROXINE SODIUM 0.1 MG/1
100 TABLET ORAL DAILY
Qty: 90 TABLET | Refills: 1 | Status: SHIPPED | OUTPATIENT
Start: 2024-08-09

## 2024-08-09 RX ORDER — OXYBUTYNIN CHLORIDE 5 MG/1
5 TABLET ORAL DAILY
Qty: 90 TABLET | Refills: 1 | Status: SHIPPED | OUTPATIENT
Start: 2024-08-09

## 2024-08-09 RX ORDER — HYDROCHLOROTHIAZIDE 25 MG/1
25 TABLET ORAL DAILY
Qty: 90 TABLET | Refills: 1 | Status: SHIPPED | OUTPATIENT
Start: 2024-08-09

## 2024-08-09 NOTE — TELEPHONE ENCOUNTER
Caller: Nafisa Washington    Relationship: Self    Best call back number: [phone]     PATIENT ONLY HAS 2 DAYS LEFT OF    oxybutynin (DITROPAN) 5 MG tablet.

## 2024-10-01 DIAGNOSIS — I10 ESSENTIAL HYPERTENSION: Primary | ICD-10-CM

## 2024-10-01 DIAGNOSIS — E78.2 MIXED HYPERLIPIDEMIA: ICD-10-CM

## 2024-10-01 DIAGNOSIS — E89.0 POSTABLATIVE HYPOTHYROIDISM: ICD-10-CM

## 2024-10-07 PROCEDURE — 84443 ASSAY THYROID STIM HORMONE: CPT | Performed by: NURSE PRACTITIONER

## 2024-10-07 PROCEDURE — 80061 LIPID PANEL: CPT | Performed by: NURSE PRACTITIONER

## 2024-10-07 PROCEDURE — 80053 COMPREHEN METABOLIC PANEL: CPT | Performed by: NURSE PRACTITIONER

## 2024-10-07 PROCEDURE — 85027 COMPLETE CBC AUTOMATED: CPT | Performed by: NURSE PRACTITIONER

## 2024-10-07 PROCEDURE — 36415 COLL VENOUS BLD VENIPUNCTURE: CPT | Performed by: NURSE PRACTITIONER

## 2024-10-08 LAB
ALBUMIN SERPL-MCNC: 4.3 G/DL (ref 3.5–5.2)
ALBUMIN/GLOB SERPL: 1.8 G/DL
ALP SERPL-CCNC: 72 U/L (ref 39–117)
ALT SERPL W P-5'-P-CCNC: 19 U/L (ref 1–33)
ANION GAP SERPL CALCULATED.3IONS-SCNC: 13 MMOL/L (ref 5–15)
AST SERPL-CCNC: 30 U/L (ref 1–32)
BILIRUB SERPL-MCNC: 0.5 MG/DL (ref 0–1.2)
BUN SERPL-MCNC: 10 MG/DL (ref 8–23)
BUN/CREAT SERPL: 19.6 (ref 7–25)
CALCIUM SPEC-SCNC: 9.6 MG/DL (ref 8.6–10.5)
CHLORIDE SERPL-SCNC: 89 MMOL/L (ref 98–107)
CHOLEST SERPL-MCNC: 181 MG/DL (ref 0–200)
CO2 SERPL-SCNC: 27 MMOL/L (ref 22–29)
CREAT SERPL-MCNC: 0.51 MG/DL (ref 0.57–1)
DEPRECATED RDW RBC AUTO: 43.2 FL (ref 37–54)
EGFRCR SERPLBLD CKD-EPI 2021: 95.7 ML/MIN/1.73
ERYTHROCYTE [DISTWIDTH] IN BLOOD BY AUTOMATED COUNT: 12.8 % (ref 12.3–15.4)
GLOBULIN UR ELPH-MCNC: 2.4 GM/DL
GLUCOSE SERPL-MCNC: 101 MG/DL (ref 65–99)
HCT VFR BLD AUTO: 41.3 % (ref 34–46.6)
HDLC SERPL-MCNC: 73 MG/DL (ref 40–60)
HGB BLD-MCNC: 13.4 G/DL (ref 12–15.9)
LDLC SERPL CALC-MCNC: 97 MG/DL (ref 0–100)
LDLC/HDLC SERPL: 1.32 {RATIO}
MCH RBC QN AUTO: 30 PG (ref 26.6–33)
MCHC RBC AUTO-ENTMCNC: 32.4 G/DL (ref 31.5–35.7)
MCV RBC AUTO: 92.6 FL (ref 79–97)
PLATELET # BLD AUTO: 276 10*3/MM3 (ref 140–450)
PMV BLD AUTO: 10.5 FL (ref 6–12)
POTASSIUM SERPL-SCNC: 3.9 MMOL/L (ref 3.5–5.2)
PROT SERPL-MCNC: 6.7 G/DL (ref 6–8.5)
RBC # BLD AUTO: 4.46 10*6/MM3 (ref 3.77–5.28)
SODIUM SERPL-SCNC: 129 MMOL/L (ref 136–145)
TRIGL SERPL-MCNC: 58 MG/DL (ref 0–150)
TSH SERPL DL<=0.05 MIU/L-ACNC: 0.71 UIU/ML (ref 0.27–4.2)
VLDLC SERPL-MCNC: 11 MG/DL (ref 5–40)
WBC NRBC COR # BLD AUTO: 6.45 10*3/MM3 (ref 3.4–10.8)

## 2024-10-14 ENCOUNTER — OFFICE VISIT (OUTPATIENT)
Dept: FAMILY MEDICINE CLINIC | Facility: CLINIC | Age: 79
End: 2024-10-14
Payer: MEDICARE

## 2024-10-14 VITALS
SYSTOLIC BLOOD PRESSURE: 128 MMHG | OXYGEN SATURATION: 100 % | WEIGHT: 183.8 LBS | HEIGHT: 67 IN | HEART RATE: 65 BPM | BODY MASS INDEX: 28.85 KG/M2 | DIASTOLIC BLOOD PRESSURE: 70 MMHG

## 2024-10-14 DIAGNOSIS — M15.4 EROSIVE OSTEOARTHRITIS OF MULTIPLE SITES: ICD-10-CM

## 2024-10-14 DIAGNOSIS — E87.1 CHRONIC HYPONATREMIA: ICD-10-CM

## 2024-10-14 DIAGNOSIS — Z23 INFLUENZA VACCINE ADMINISTERED: ICD-10-CM

## 2024-10-14 DIAGNOSIS — I10 ESSENTIAL HYPERTENSION: ICD-10-CM

## 2024-10-14 DIAGNOSIS — E78.2 MIXED HYPERLIPIDEMIA: ICD-10-CM

## 2024-10-14 DIAGNOSIS — Z00.00 MEDICARE ANNUAL WELLNESS VISIT, SUBSEQUENT: Primary | ICD-10-CM

## 2024-10-14 DIAGNOSIS — N39.46 MIXED STRESS AND URGE URINARY INCONTINENCE: ICD-10-CM

## 2024-10-14 DIAGNOSIS — E89.0 POSTABLATIVE HYPOTHYROIDISM: ICD-10-CM

## 2024-10-14 DIAGNOSIS — M17.12 ARTHRITIS OF LEFT KNEE: ICD-10-CM

## 2024-10-14 DIAGNOSIS — J44.89 ASTHMA WITH COPD: ICD-10-CM

## 2024-10-14 PROCEDURE — 3074F SYST BP LT 130 MM HG: CPT | Performed by: NURSE PRACTITIONER

## 2024-10-14 PROCEDURE — G0439 PPPS, SUBSEQ VISIT: HCPCS | Performed by: NURSE PRACTITIONER

## 2024-10-14 PROCEDURE — G0008 ADMIN INFLUENZA VIRUS VAC: HCPCS | Performed by: NURSE PRACTITIONER

## 2024-10-14 PROCEDURE — 3078F DIAST BP <80 MM HG: CPT | Performed by: NURSE PRACTITIONER

## 2024-10-14 PROCEDURE — 90662 IIV NO PRSV INCREASED AG IM: CPT | Performed by: NURSE PRACTITIONER

## 2024-10-14 PROCEDURE — 1170F FXNL STATUS ASSESSED: CPT | Performed by: NURSE PRACTITIONER

## 2024-10-14 PROCEDURE — 1125F AMNT PAIN NOTED PAIN PRSNT: CPT | Performed by: NURSE PRACTITIONER

## 2024-10-14 RX ORDER — FLUTICASONE PROPIONATE AND SALMETEROL 100; 50 UG/1; UG/1
1 POWDER RESPIRATORY (INHALATION) 2 TIMES DAILY
COMMUNITY
Start: 2024-09-19

## 2024-10-14 NOTE — PROGRESS NOTES
The ABCs of the Annual Wellness Visit  Subsequent Medicare Wellness Visit    Chief Complaint   Patient presents with    Medicare Wellness-subsequent     Going to have a full knee replacement done on left knee     Subjective     History of Present Illness:  Nafisa Washington is a 79 y.o. female who presents for a Subsequent Medicare Wellness Visit, to review labs and follow up on chronic conditions.  HPI      Hypothyroidism, with history of thyroid ablation.  TSH has been stable, patient on levothyroxine 100. She has a sister with history of thyroid cancer, pt had normal thyroid ultrasound 2022.  Denies symptoms of constipation, hot or cold intolerance, hair loss, abnormal heart rate and fatigue.      HTN, stable on meds and takes as directed, denies chest pain, headache, shortness of air, palpitations and swelling of extremities.      Hyperlipidemia, changed diet, she is eating more fish/chicken, salads. The patient denies muscle aches, constipation, diarrhea, GI upset, fatigue, chest pain/pressure, exercise intolerance, dyspnea, palpitations, syncope and pedal edema.       Family history of breast cancer, she completes mammograms every year, up-to-date and normal in February     Erosive osteoarthritis of multiple joints, osteoporosis, follows with rheumatology on sulfasalazine and Reclast.    -DEXA up to date     Primary osteoarthritis of left >right knee, she was following with [facility] and receiving injections to both knees, but hasn't had in >6mo. She is planning left TKR with Dr. Shankar's group in March-earlier if they can get her in. She is working on weight loss but unable to be as active as she wants d/t the pain. She gets dizzy standing d/t having to bend at the waist.      Asthma, Advair diskus was unavailable, now back in stock, she is using Advair daily and albuterol if needed.      Urinary urge/stress incontinence, improved on oxybutynin     Colonoscopy up-to-date September 2023, polyps  removed, recommended recall 3 years     Macular degeneration, seeing Dr. Isaac in [place].      She reports being under stress d/t her husbands condition, sisters husbands death and pain. She is overall doing well, taking it day by day       The following portions of the patient's history were reviewed and   updated as appropriate: allergies, current medications, past family history, past medical history, past social history, past surgical history, and problem list.      Compared to one year ago, the patient feels her physical   health is the same.    Compared to one year ago, the patient feels her mental   health is the same.    Recent Hospitalizations:  This patient has had a [facility] admission record on file within the last 365 days.    Current Medical Providers:  Patient Care Team:  Nicolle Ng APRN as PCP - General (Nurse Practitioner)  Coretta Humphries APRN as Nurse Practitioner (Nurse Practitioner)    Outpatient Medications Prior to Visit   Medication Sig Dispense Refill    amLODIPine (NORVASC) 10 MG tablet Take 1 tablet by mouth Daily. 90 tablet 1    atorvastatin (LIPITOR) 10 MG tablet Take 1 tablet by mouth Daily. 90 tablet 1    cholecalciferol (VITAMIN D3) 1000 units tablet Take 1 tablet by mouth Daily.      Ferrous Sulfate Dried  (50 Fe) MG tablet controlled-release Take  by mouth.      Fluticasone-Salmeterol (ADVAIR/WIXELA) 100-50 MCG/ACT DISKUS Inhale 1 puff 2 (Two) Times a Day.      fosinopril (MONOPRIL) 20 MG tablet Take 1 tablet by mouth 2 (Two) Times a Day. 180 tablet 1    hydroCHLOROthiazide 25 MG tablet Take 1 tablet by mouth Daily. 90 tablet 1    levothyroxine (SYNTHROID, LEVOTHROID) 100 MCG tablet Take 1 tablet by mouth Daily. 90 tablet 1    Multiple Vitamin (MULTI-VITAMIN DAILY PO) Take  by mouth.      multivitamins-minerals (PRESERVISION AREDS 2) capsule capsule Take 1 capsule by mouth 2 (Two) Times a Day.      oxybutynin (DITROPAN) 5 MG tablet Take 1 tablet by mouth  "Daily. 90 tablet 1    sulfaSALAzine (AZULFIDINE) 500 MG tablet Take 1 tablet by mouth 2 (Two) Times a Day.      Ventolin  (90 Base) MCG/ACT inhaler Inhale 2 puffs Every 4 (Four) Hours As Needed for Wheezing. 18 g 5    zoledronic acid (RECLAST) 5 MG/100ML solution infusion Infuse 100 mL into a venous catheter 1 (One) Time.      fluticasone-salmeterol (Advair HFA) 115-21 MCG/ACT inhaler Inhale 2 puffs 2 (Two) Times a Day. 12 g 11    sorbitol 70 % solution solution TAKE 100 ML BY MOUTH AS DIRECTED FOR 1 DAY (Patient not taking: Reported on 10/14/2024)       No facility-administered medications prior to visit.       No opioid medication identified on active medication list. I have reviewed chart for other potential  high risk medication/s and harmful drug interactions in the elderly.        Aspirin is not on active medication list.  Aspirin use is not indicated based on review of current medical condition/s. Risk of harm outweighs potential benefits.  .    Patient Active Problem List   Diagnosis    Asthma with COPD    Encounter for general adult medical examination without abnormal findings    Family history of malignant neoplasm of breast    Fibrocystic breast changes    Hypertension    Hypothyroidism    Macular degeneration     Advance Care Planning   Advance Directive is not on file.  ACP discussion was held with the patient during this visit. Patient does not have an advance directive, information provided.    Reviewed chart for potential of high risk medication in the elderly: yes  Reviewed chart for potential of harmful drug interactions in the elderly:yes       Objective       Vitals:    10/14/24 1047   BP: 128/70   BP Location: Left arm   Patient Position: Sitting   Cuff Size: Adult   Pulse: 65   SpO2: 100%   Weight: 83.4 kg (183 lb 12.8 oz)   Height: 170.2 cm (67\")   PainSc:   8   PainLoc: Knee     BMI Readings from Last 1 Encounters:   10/14/24 28.79 kg/m²   BMI is within normal parameters. No follow-up " required.  Body mass index is 28.79 kg/m².  BMI has not been calculated during today's encounter.     Does the patient have evidence of cognitive impairment? No    Physical Exam  Constitutional:       General: She is not in acute distress.     Appearance: Normal appearance. She is well-developed. She is not ill-appearing or diaphoretic.   HENT:      Head: Normocephalic.   Eyes:      Conjunctiva/sclera: Conjunctivae normal.      Pupils: Pupils are equal, round, and reactive to light.   Neck:      Thyroid: No thyromegaly.      Vascular: No JVD.   Cardiovascular:      Rate and Rhythm: Normal rate and regular rhythm.      Heart sounds: Normal heart sounds. No murmur heard.  Pulmonary:      Effort: Pulmonary effort is normal. No respiratory distress.      Breath sounds: Normal breath sounds. No wheezing or rhonchi.   Abdominal:      General: Bowel sounds are normal. There is no distension.      Palpations: Abdomen is soft.      Tenderness: There is no abdominal tenderness.   Musculoskeletal:         General: Swelling (BLE trace pitting) and tenderness (L knee OA, severe alvarez rom and pain with ambulation) present. Normal range of motion.      Cervical back: Normal range of motion and neck supple. No tenderness.   Lymphadenopathy:      Cervical: No cervical adenopathy.   Skin:     General: Skin is warm and dry.      Coloration: Skin is not jaundiced.      Findings: No erythema or rash.   Neurological:      General: No focal deficit present.      Mental Status: She is alert and oriented to person, place, and time. Mental status is at baseline.      Sensory: No sensory deficit.      Motor: No weakness.      Gait: Gait abnormal (d/t L knee).   Psychiatric:         Mood and Affect: Mood normal.         Behavior: Behavior normal.         Thought Content: Thought content normal.         Judgment: Judgment normal.           Lab Results   Component Value Date    TRIG 58 10/07/2024    HDL 73 (H) 10/07/2024    LDL 97 10/07/2024     VLDL 11 10/07/2024          HEALTH RISK ASSESSMENT    Smoking Status:  Social History     Tobacco Use   Smoking Status Never    Passive exposure: Never   Smokeless Tobacco Never     Alcohol Consumption:  Social History     Substance and Sexual Activity   Alcohol Use Never     Fall Risk Screen:    CELESTINA Fall Risk Assessment was completed, and patient is at LOW risk for falls.Assessment completed on:4/8/2024    Depression Screen:       10/14/2024    10:00 AM   PHQ-2/PHQ-9 Depression Screening   Little interest or pleasure in doing things Not at all   Feeling down, depressed, or hopeless Not at all       Health Habits and Functional and Cognitive Screening:      10/13/2024     5:30 PM   Functional & Cognitive Status   Do you have difficulty preparing food and eating? No    Do you have difficulty bathing yourself, getting dressed or grooming yourself? No    Do you have difficulty using the toilet? No    Do you have difficulty moving around from place to place? No    Do you have trouble with steps or getting out of a bed or a chair? No    Current Diet Well Balanced Diet    Dental Exam Up to date    Eye Exam Up to date    Exercise (times per week) 2 times per week    Current Exercises Include Gardening    Do you need help using the phone?  No    Are you deaf or do you have serious difficulty hearing?  No    Do you need help to go to places out of walking distance? No    Do you need help shopping? No    Do you need help preparing meals?  No    Do you need help with housework?  No    Do you need help with laundry? No    Do you need help taking your medications? No    Do you need help managing money? No    Do you ever drive or ride in a car without wearing a seat belt? No    Have you felt unusual stress, anger or loneliness in the last month? No    Who do you live with? Spouse    If you need help, do you have trouble finding someone available to you? No    Have you been bothered in the last four weeks by sexual problems? No     Do you have difficulty concentrating, remembering or making decisions? No        Patient-reported       ATTENTION  What is the year: correct  What is the month of the year: correct  What is the day of the week?: correct  What is the date?: correct  MEMORY  Repeat address three times, only score third attempt: [address], [place]: 0  HOW MANY ANIMALS DID THE PATIENT NAME  Verbal Fluency -- Animal Names (0-25): 17-21  CLOCK DRAWING  Clock Drawing: All Correct  MEMORY RECALL  Tell me what you remember about that name and address we were repeating at the beginnin  ACE TOTAL SCORE  Total ACE Score - <25/30 strongly suggests cognitive impairment; <21/30 almost certainly shows dementia: 15 pt declined to do address portion of acemini, all other questions answered correctly    Age-appropriate Screening Schedule:  Refer to the list below for future screening recommendations based on patient's age, sex and/or medical conditions. Orders for these recommended tests are listed in the plan section. The patient has been provided with a written plan.    Health Maintenance   Topic Date Due    RSV Vaccine - Adults (1 - 1-dose 75+ series) Never done    ANNUAL WELLNESS VISIT  2024    COVID-19 Vaccine (2023-24 season) 2024    BMI FOLLOWUP  2025    LIPID PANEL  10/07/2025    DXA SCAN  2026    COLORECTAL CANCER SCREENING  2026    TDAP/TD VACCINES (2 - Tdap) 10/27/2029    HEPATITIS C SCREENING  Completed    INFLUENZA VACCINE  Completed    Pneumococcal Vaccine 65+  Completed    ZOSTER VACCINE  Completed            Assessment & Plan      CMS Preventative Services Quick Reference  Risk Factors Identified During Encounter  Immunizations Discussed/Encouraged: Influenza and RSV (Respiratory Syncytial Virus)  The above risks/problems have been discussed with the patient.  Follow up actions/plans if indicated are seen below in the Assessment/Plan Section.  Pertinent information  has been shared with the patient in the After Visit Summary.    Diagnoses and all orders for this visit:    1. Medicare annual wellness visit, subsequent (Primary)    2. Chronic hyponatremia  Comments:  add sodium chloride 500mg otc daily    3. Essential hypertension    4. Mixed hyperlipidemia    5. Postablative hypothyroidism    6. Mixed stress and urge urinary incontinence    7. Erosive osteoarthritis of multiple sites    8. Asthma with COPD    9. Arthritis of left knee    10. Influenza vaccine administered    Other orders  -     Fluzone High-Dose 65+yrs (2182-2400)      Conditions stable, no med refills needed  Cont current med regimen  Colonoscopy, mammo, dexa up to date   Labs reviewed and essentially stable   Age appropriate preventative counseling provided, including healthy lifestyle modifications and exercise  Flu shot today  Rec RSV vaccine    Follow Up:   Return in about 6 months (around 4/14/2025) for Recheck. HTN panel prior to appt .     An After Visit Summary and PPPS were given to the patient.            EMR Dragon transcription disclaimer:  Part of this note may be an electronic transcription/translation of spoken language to printed text using the Dragon Dictation System.

## 2024-11-12 DIAGNOSIS — I10 ESSENTIAL HYPERTENSION: ICD-10-CM

## 2024-11-12 RX ORDER — FOSINOPRIL SODIUM 20 MG/1
20 TABLET ORAL 2 TIMES DAILY
Qty: 180 TABLET | Refills: 1 | Status: SHIPPED | OUTPATIENT
Start: 2024-11-12

## 2024-12-18 ENCOUNTER — OFFICE VISIT (OUTPATIENT)
Dept: FAMILY MEDICINE CLINIC | Facility: CLINIC | Age: 79
End: 2024-12-18
Payer: MEDICARE

## 2024-12-18 VITALS
WEIGHT: 180.6 LBS | OXYGEN SATURATION: 100 % | HEIGHT: 67 IN | BODY MASS INDEX: 28.35 KG/M2 | DIASTOLIC BLOOD PRESSURE: 72 MMHG | HEART RATE: 64 BPM | SYSTOLIC BLOOD PRESSURE: 135 MMHG

## 2024-12-18 DIAGNOSIS — M17.12 ARTHRITIS OF LEFT KNEE: ICD-10-CM

## 2024-12-18 DIAGNOSIS — Z01.818 PREOPERATIVE CLEARANCE: Primary | ICD-10-CM

## 2024-12-18 DIAGNOSIS — E87.6 HYPOKALEMIA: ICD-10-CM

## 2024-12-18 PROCEDURE — 1125F AMNT PAIN NOTED PAIN PRSNT: CPT | Performed by: NURSE PRACTITIONER

## 2024-12-18 PROCEDURE — 1160F RVW MEDS BY RX/DR IN RCRD: CPT | Performed by: NURSE PRACTITIONER

## 2024-12-18 PROCEDURE — 1159F MED LIST DOCD IN RCRD: CPT | Performed by: NURSE PRACTITIONER

## 2024-12-18 PROCEDURE — 3075F SYST BP GE 130 - 139MM HG: CPT | Performed by: NURSE PRACTITIONER

## 2024-12-18 PROCEDURE — 99213 OFFICE O/P EST LOW 20 MIN: CPT | Performed by: NURSE PRACTITIONER

## 2024-12-18 PROCEDURE — 3078F DIAST BP <80 MM HG: CPT | Performed by: NURSE PRACTITIONER

## 2024-12-18 RX ORDER — POTASSIUM CHLORIDE 750 MG/1
10 TABLET, EXTENDED RELEASE ORAL DAILY
Qty: 30 TABLET | Refills: 0 | Status: SHIPPED | OUTPATIENT
Start: 2024-12-18

## 2024-12-18 NOTE — PROGRESS NOTES
Chief Complaint  Chief Complaint   Patient presents with    Surgical Clearance     Total knee replacement Left           Subjective          Nafisa Washington presents to [facility] for   History of Present Illness    Patient with history of hypertension, hyperlipidemia, anemia, hypothyroidism, asthma, erosive osteoarthritis, osteoporosis-now osteopenia on Reclast is planning left total knee replacement on 1/6/2025 per Dr. Shankar.  She has failed corticosteroid joint injections and conservative measures.  She is working on weight loss but unable to be as active as she wants d/t the pain. She presents today for medical clearance. She had preop labs, EKG yesterday at .       The following portions of the patient's history were reviewed and updated as appropriate: allergies, current medications, past family history, past medical history, past social history, past surgical history and problem list.    Past Medical History:   Diagnosis Date    Allergic     Asthma     Disease of thyroid gland     Hyperlipidemia     Hypertension     Knee swelling 5 years ago    Macular degeneration      Past Surgical History:   Procedure Laterality Date    ADENOIDECTOMY      DENTAL PROCEDURE      HYSTERECTOMY      Full     TONSILLECTOMY      TRIGGER POINT INJECTION  5 years ago    bad knees- gel injections     History reviewed. No pertinent family history.  Social History     Tobacco Use    Smoking status: Never     Passive exposure: Never    Smokeless tobacco: Never   Substance Use Topics    Alcohol use: Never       Current Outpatient Medications:     amLODIPine (NORVASC) 10 MG tablet, Take 1 tablet by mouth Daily., Disp: 90 tablet, Rfl: 1    atorvastatin (LIPITOR) 10 MG tablet, Take 1 tablet by mouth Daily., Disp: 90 tablet, Rfl: 1    cholecalciferol (VITAMIN D3) 1000 units tablet, Take 1 tablet by mouth Daily., Disp: , Rfl:     Ferrous Sulfate Dried  (50 Fe) MG tablet controlled-release, Take   "by mouth., Disp: , Rfl:     Fluticasone-Salmeterol (ADVAIR/WIXELA) 100-50 MCG/ACT DISKUS, Inhale 1 puff 2 (Two) Times a Day., Disp: , Rfl:     fosinopril (MONOPRIL) 20 MG tablet, TAKE 1 TABLET BY MOUTH TWICE DAILY, Disp: 180 tablet, Rfl: 1    hydroCHLOROthiazide 25 MG tablet, Take 1 tablet by mouth Daily., Disp: 90 tablet, Rfl: 1    levothyroxine (SYNTHROID, LEVOTHROID) 100 MCG tablet, Take 1 tablet by mouth Daily., Disp: 90 tablet, Rfl: 1    Multiple Vitamin (MULTI-VITAMIN DAILY PO), Take  by mouth., Disp: , Rfl:     multivitamins-minerals (PRESERVISION AREDS 2) capsule capsule, Take 1 capsule by mouth 2 (Two) Times a Day., Disp: , Rfl:     oxybutynin (DITROPAN) 5 MG tablet, Take 1 tablet by mouth Daily., Disp: 90 tablet, Rfl: 1    sulfaSALAzine (AZULFIDINE) 500 MG tablet, Take 1 tablet by mouth 2 (Two) Times a Day., Disp: , Rfl:     Ventolin  (90 Base) MCG/ACT inhaler, Inhale 2 puffs Every 4 (Four) Hours As Needed for Wheezing., Disp: 18 g, Rfl: 5    zoledronic acid (RECLAST) 5 MG/100ML solution infusion, Infuse 100 mL into a venous catheter 1 (One) Time., Disp: , Rfl:     potassium chloride (KLOR-CON) 10 MEQ CR tablet, Take 1 tablet by mouth Daily., Disp: 30 tablet, Rfl: 0    Objective   Vital Signs:   Vitals:    12/18/24 1314   BP: 135/72   BP Location: Left arm   Patient Position: Sitting   Cuff Size: Adult   Pulse: 64   SpO2: 100%   Weight: 81.9 kg (180 lb 9.6 oz)   Height: 170.2 cm (67\")   PainSc:   8   PainLoc: Knee       Body mass index is 28.29 kg/m².    Physical Exam  Constitutional:       General: She is not in acute distress.     Appearance: Normal appearance. She is well-developed. She is not ill-appearing or diaphoretic.   HENT:      Head: Normocephalic.   Eyes:      Conjunctiva/sclera: Conjunctivae normal.      Pupils: Pupils are equal, round, and reactive to light.   Neck:      Thyroid: No thyromegaly.      Vascular: No JVD.   Cardiovascular:      Rate and Rhythm: Normal rate and regular " rhythm.      Heart sounds: Normal heart sounds. No murmur heard.  Pulmonary:      Effort: Pulmonary effort is normal. No respiratory distress.      Breath sounds: Normal breath sounds. No wheezing or rhonchi.   Abdominal:      General: Bowel sounds are normal. There is no distension.      Palpations: Abdomen is soft.      Tenderness: There is no abdominal tenderness.   Musculoskeletal:         General: Swelling (BLE trace pitting) and tenderness (L knee OA, severe alvarez rom and pain with ambulation) present. Normal range of motion.      Cervical back: Normal range of motion and neck supple. No tenderness.   Lymphadenopathy:      Cervical: No cervical adenopathy.   Skin:     General: Skin is warm and dry.      Coloration: Skin is not jaundiced.      Findings: No erythema or rash.   Neurological:      General: No focal deficit present.      Mental Status: She is alert and oriented to person, place, and time. Mental status is at baseline.      Sensory: No sensory deficit.      Motor: No weakness.      Gait: Gait abnormal (d/t L knee).   Psychiatric:         Mood and Affect: Mood normal.         Behavior: Behavior normal.         Thought Content: Thought content normal.         Judgment: Judgment normal.          Result Review :     No visits with results within 7 Day(s) from this visit.   Latest known visit with results is:   Admission on 11/20/2024, Discharged on 11/20/2024   Component Date Value Ref Range Status    SARS Antigen 11/20/2024 Not Detected  Not Detected, Presumptive Negative Final    Influenza A Antigen LEEROY 11/20/2024 Not Detected  Not Detected Final    Influenza B Antigen LEEROY 11/20/2024 Not Detected  Not Detected Final    Internal Control 11/20/2024 Passed  Passed Final    Lot Number 11/20/2024 [id number]   Final    Expiration Date 11/20/2024 09/04/2025   Final                              Assessment and Plan    Diagnoses and all orders for this visit:    1. Preoperative clearance (Primary)    2. Arthritis  of left knee    3. Hypokalemia    Other orders  -     potassium chloride (KLOR-CON) 10 MEQ CR tablet; Take 1 tablet by mouth Daily.  Dispense: 30 tablet; Refill: 0      UL preop labs and CT reviewed, all essentially stable with low K.   EKG is not available for review-will be reviewed by anesthesia team  Add kcl to take daily  Continue current medication regimen  Cleared for surgery, okay to proceed with left TKA-faxed clearance and ov note to Dr. Shankar today.         I spent 20 minutes caring for Nafisa Washington on this date of service. This time includes time spent by me in the following activities: preparing for the visit, reviewing tests, performing a medically appropriate examination and/or evaluation , counseling and educating the patient/family/caregiver, ordering medications, tests, or procedures and documenting information in the medical record        Follow Up     Return for Next scheduled follow up, earlier if needed .  Patient was given instructions and counseling regarding her condition or for health maintenance advice. Please see specific information pulled into the AVS if appropriate.        Part of this note may be an electronic transcription/translation of spoken language to printed text using the Dragon Dictation System

## 2024-12-19 RX ORDER — POTASSIUM CHLORIDE 750 MG/1
10 TABLET, EXTENDED RELEASE ORAL DAILY
Qty: 90 TABLET | OUTPATIENT
Start: 2024-12-19

## 2025-01-14 RX ORDER — POTASSIUM CHLORIDE 750 MG/1
10 TABLET, EXTENDED RELEASE ORAL DAILY
Qty: 90 TABLET | Refills: 1 | Status: SHIPPED | OUTPATIENT
Start: 2025-01-14 | End: 2025-01-16 | Stop reason: SDUPTHER

## 2025-01-16 DIAGNOSIS — I10 ESSENTIAL HYPERTENSION: ICD-10-CM

## 2025-01-16 DIAGNOSIS — E03.9 HYPOTHYROIDISM, UNSPECIFIED TYPE: ICD-10-CM

## 2025-01-16 DIAGNOSIS — E78.2 MIXED HYPERLIPIDEMIA: ICD-10-CM

## 2025-01-16 DIAGNOSIS — N39.46 MIXED STRESS AND URGE URINARY INCONTINENCE: ICD-10-CM

## 2025-01-16 NOTE — TELEPHONE ENCOUNTER
Caller: Nafisa Washington    Relationship: Self    Best call back number: [phone]    Requested Prescriptions:   Requested Prescriptions     Pending Prescriptions Disp Refills    amLODIPine (NORVASC) 10 MG tablet 90 tablet 1     Sig: Take 1 tablet by mouth Daily.    atorvastatin (LIPITOR) 10 MG tablet 90 tablet 1     Sig: Take 1 tablet by mouth Daily.    Ferrous Sulfate Dried  (50 Fe) MG tablet controlled-release       Sig: Take  by mouth.    Fluticasone-Salmeterol (ADVAIR/WIXELA) 100-50 MCG/ACT DISKUS       Sig: Inhale 1 puff 2 (Two) Times a Day.    fosinopril (MONOPRIL) 20 MG tablet 180 tablet 1     Sig: Take 1 tablet by mouth 2 (Two) Times a Day.    hydroCHLOROthiazide 25 MG tablet 90 tablet 1     Sig: Take 1 tablet by mouth Daily.    levothyroxine (SYNTHROID, LEVOTHROID) 100 MCG tablet 90 tablet 1     Sig: Take 1 tablet by mouth Daily.    oxybutynin (DITROPAN) 5 MG tablet 90 tablet 1     Sig: Take 1 tablet by mouth Daily.    sulfaSALAzine (AZULFIDINE) 500 MG tablet       Sig: Take 1 tablet by mouth 2 (Two) Times a Day.    Ventolin  (90 Base) MCG/ACT inhaler 18 g 5     Sig: Inhale 2 puffs Every 4 (Four) Hours As Needed for Wheezing.    potassium chloride 10 MEQ CR tablet 90 tablet 1     Sig: Take 1 tablet by mouth Daily.        Pharmacy where request should be sent: [email or web address][id number] [address] AT [place] [address] & [facility] - [phone]  - [phone] FX     Last office visit with prescribing clinician: 12/18/2024   Last telemedicine visit with prescribing clinician: Visit date not found   Next office visit with prescribing clinician: 4/14/2025     Additional details provided by patient: WILL NEED TRANSFERRED AND FILLED AT #[id number] [email or web address]    Does the patient have less than a 3 day supply:  [x] Yes  [] No    Would you like a call back once the refill request has been completed: [] Yes [x] No    If the office needs to give you a call back, can  they leave a voicemail: [] Yes [x] No    Dia Sharma   01/16/25 13:11 EST

## 2025-01-17 RX ORDER — SULFASALAZINE 500 MG/1
500 TABLET ORAL 2 TIMES DAILY
Qty: 180 TABLET | Refills: 1 | Status: SHIPPED | OUTPATIENT
Start: 2025-01-17

## 2025-01-17 RX ORDER — POTASSIUM CHLORIDE 750 MG/1
10 TABLET, EXTENDED RELEASE ORAL DAILY
Qty: 90 TABLET | Refills: 1 | Status: SHIPPED | OUTPATIENT
Start: 2025-01-17

## 2025-01-17 RX ORDER — LEVOTHYROXINE SODIUM 100 UG/1
100 TABLET ORAL DAILY
Qty: 90 TABLET | Refills: 1 | Status: SHIPPED | OUTPATIENT
Start: 2025-01-17

## 2025-01-17 RX ORDER — OXYBUTYNIN CHLORIDE 5 MG/1
5 TABLET ORAL DAILY
Qty: 90 TABLET | Refills: 1 | Status: SHIPPED | OUTPATIENT
Start: 2025-01-17

## 2025-01-17 RX ORDER — AMLODIPINE BESYLATE 10 MG/1
10 TABLET ORAL DAILY
Qty: 90 TABLET | Refills: 1 | Status: SHIPPED | OUTPATIENT
Start: 2025-01-17

## 2025-01-17 RX ORDER — FLUTICASONE PROPIONATE AND SALMETEROL 100; 50 UG/1; UG/1
1 POWDER RESPIRATORY (INHALATION) 2 TIMES DAILY
OUTPATIENT
Start: 2025-01-17

## 2025-01-17 RX ORDER — ATORVASTATIN CALCIUM 10 MG/1
10 TABLET, FILM COATED ORAL DAILY
Qty: 90 TABLET | Refills: 1 | Status: SHIPPED | OUTPATIENT
Start: 2025-01-17

## 2025-01-17 RX ORDER — HYDROCHLOROTHIAZIDE 25 MG/1
25 TABLET ORAL DAILY
Qty: 90 TABLET | Refills: 1 | Status: SHIPPED | OUTPATIENT
Start: 2025-01-17

## 2025-01-17 RX ORDER — ALBUTEROL SULFATE 90 UG/1
2 AEROSOL, METERED RESPIRATORY (INHALATION) EVERY 4 HOURS PRN
Qty: 18 G | Refills: 5 | Status: SHIPPED | OUTPATIENT
Start: 2025-01-17

## 2025-01-17 RX ORDER — FLUTICASONE PROPIONATE AND SALMETEROL 100; 50 UG/1; UG/1
1 POWDER RESPIRATORY (INHALATION) 2 TIMES DAILY
Qty: 60 EACH | Refills: 3 | Status: SHIPPED | OUTPATIENT
Start: 2025-01-17

## 2025-01-17 RX ORDER — FOSINOPRIL SODIUM 20 MG/1
20 TABLET ORAL 2 TIMES DAILY
Qty: 180 TABLET | Refills: 1 | Status: SHIPPED | OUTPATIENT
Start: 2025-01-17

## 2025-01-27 ENCOUNTER — TREATMENT (OUTPATIENT)
Dept: PHYSICAL THERAPY | Facility: CLINIC | Age: 80
End: 2025-01-27
Payer: MEDICARE

## 2025-01-27 ENCOUNTER — TRANSCRIBE ORDERS (OUTPATIENT)
Dept: PHYSICAL THERAPY | Facility: CLINIC | Age: 80
End: 2025-01-27
Payer: MEDICARE

## 2025-01-27 DIAGNOSIS — R26.2 DIFFICULTY WALKING: ICD-10-CM

## 2025-01-27 DIAGNOSIS — M25.562 ACUTE PAIN OF LEFT KNEE: ICD-10-CM

## 2025-01-27 DIAGNOSIS — Z96.652 H/O TOTAL KNEE REPLACEMENT, LEFT: Primary | ICD-10-CM

## 2025-01-27 PROCEDURE — 97110 THERAPEUTIC EXERCISES: CPT | Performed by: PHYSICAL THERAPIST

## 2025-01-27 PROCEDURE — 97112 NEUROMUSCULAR REEDUCATION: CPT | Performed by: PHYSICAL THERAPIST

## 2025-01-27 PROCEDURE — 97161 PT EVAL LOW COMPLEX 20 MIN: CPT | Performed by: PHYSICAL THERAPIST

## 2025-01-27 PROCEDURE — 97535 SELF CARE MNGMENT TRAINING: CPT | Performed by: PHYSICAL THERAPIST

## 2025-01-27 NOTE — PROGRESS NOTES
Physical Therapy Initial Evaluation and Plan of Care    Patient: Nafisa Washington   : 1945  Diagnosis/ICD-10 Code:  H/O total knee replacement, left [Z96.652]  Referring practitioner: CANDELARIO Turk  Outcome Measure:LEFS:36    Diagnoses and all orders for this visit:    1. H/O total knee replacement, left (Primary)    2. Difficulty walking    3. Acute pain of left knee         Subjective Evaluation    History of Present Illness  Mechanism of injury: Pt reports to therapy with L TKA on 25 with pt reporting that she had HHPT prior to coming into therapy. Pt reports that prior to sx  she did not utilize an AD but generally uses her STC but will use a walker when she is having a tougher time. No numbness or tingling noted. Pt reports that she has never had a joint replacement prior to this sx. Pt reports that she is able to sleep with medication when her knee is hurting and is able to elevate her knee to help with sleep as well.     Aggravating factors: activity    Alleviating factors: rest, medication        Patient Occupation: retired Quality of life: good    Pain  Current pain ratin  At best pain ratin  At worst pain ratin    Social Support  Lives with: spouse    Patient Goals  Patient goals for therapy: increased motion, decreased pain, increased strength, independence with ADLs/IADLs, return to sport/leisure activities, improved balance and decreased edema             Objective          Observations   Left Knee   Positive for atrophy, edema and incision.       Neurological Testing     Sensation     Knee   Left Knee   Intact: Light touch    Right Knee   Intact: light touch     Active Range of Motion   Left Knee   Flexion: 84 degrees with pain  Extension: 0 degrees with pain    Right Knee   Flexion: 115 degrees   Extension: 8 degrees     Patellar Mobility   Left Knee Hypomobile in the left medial, left lateral, left superior and left inferior patellar tendon(s).     Right Knee  Patellar tendons within functional limits include the medial, lateral, superior and inferior.     Strength/Myotome Testing     Left Knee   Quadriceps contraction: poor    Right Knee   Flexion: 5  Extension: 5  Quadriceps contraction: fair    Additional Strength Details  LLE not tested at this time.     Swelling     Left Knee Girth Measurement (cm)   Joint line: 52 cm  10 cm above joint line: 54 cm  10 cm below joint line: 42 cm    Right Knee Girth Measurement (cm)   Joint line: 42 cm  10 cm above joint line: 48 cm  10 cm below joint line: 39 cm    Ambulation   Weight-Bearing Status   Assistive device used: single point cane    Observational Gait   Gait: antalgic   Decreased walking speed, stride length, left stance time, left swing time and left step length.           Assessment & Plan       Assessment  Impairments: abnormal coordination, abnormal gait, abnormal muscle firing, abnormal muscle tone, abnormal or restricted ROM, activity intolerance, impaired balance, impaired physical strength, lacks appropriate home exercise program, pain with function, safety issue and weight-bearing intolerance   Functional limitations: carrying objects, lifting, sleeping, walking, pulling, pushing, uncomfortable because of pain, sitting, standing, stooping and unable to perform repetitive tasks   Assessment details: Patient is a 79 y.o. year old female who presents to therapy with L TKA .  she presents with significant impairments including decreased LLE ROM, decreased LLE strength, impaired standing, walking, and activity tolerance, impaired LEFS score and pain. Impairments affect ADL/IADL's, functional activities, recreational activities, and community activities. Pt will benefit from skilled physical therapy to address impairments, decrease pain and restore function.  Prognosis: good    Goals  Plan Goals: Pt presents to PT with symptoms consistent with L TKA.  Pt would benefit from skilled PT intervention to address the  deficits noted.     SHORT TERM GOALS: Time for Goal Achievement: 4 weeks    1.  Patient to be compliant with HEP.   2.  Pt able to ascend/descend steps and transfer with less knee pain < 5/10  3.  Increased hip joint mobility to allow for decreased stress at tibiofemoral joint  4.  Pt. to exhibit increased LE endurance/strength to 4+/5 to allow for increased ease with sit-stand and standing/walking > 30 minutes, such as walking in grocery    LONG TERM GOALS: Time for Goal Achievement: 8 weeks  1.  Pt to score < 15% perceived disability on  LEFS  2.  Patient able to ascend/descend steps and prolonged standing & cooking with pain < 2/10  3.  Pt to exhibit full knee AROM to allow for kneeling, bending squatting as is necessary for ADL's, IADL's and household activities.   4.  Pt to exhibit LE endurance/strength to 5/5 to allow for walking, steps and transfers to occur safely  5.  Pt to demonstrate increased stability of the knee to balance on foam as needed to traverse uneven terrain .      Plan  Therapy options: will be seen for skilled therapy services  Planned modality interventions: cryotherapy, electrical stimulation/Russian stimulation, high voltage pulsed current (pain management), thermotherapy (hydrocollator packs), TENS and ultrasound  Planned therapy interventions: abdominal trunk stabilization, ADL retraining, balance/weight-bearing training, body mechanics training, fine motor coordination training, flexibility, functional ROM exercises, home exercise program, gait training, IADL retraining, joint mobilization, therapeutic activities, stretching, strengthening, spinal/joint mobilization, soft tissue mobilization, neuromuscular re-education, motor coordination training, manual therapy, postural training and transfer training  Frequency: 2x week  Duration in weeks: 10  Treatment plan discussed with: patient        History # of Personal Factors and/or Comorbidities: MODERATE (1-2)  Examination of Body  System(s): # of elements: LOW (1-2)  Clinical Presentation: STABLE   Clinical Decision Making: LOW     Timed:         Manual Therapy:         mins  12844;     Therapeutic Exercise:    10     mins  96445;     Neuromuscular Tracie:    10    mins  35769;    Therapeutic Activity:          mins  94201;     Gait Training:           mins  14375;     Ultrasound:          mins  17955;    Ionto                                   mins   24985  Self Care                       10     mins   20359        Un-Timed:  Electrical Stimulation:         mins  33554 (MC );  Dry Needling          mins self-pay  Traction          mins 19616  Low Eval     20     Mins  01786  Mod Eval          Mins  16349  High Eval                            Mins  96603  Re-Eval                               mins  62081        Timed Treatment:   30   mins   Total Treatment:     50   mins  PT SIGNATURE: Kadi Kc PT, DPT          DATE TREATMENT INITIATED: 1/27/2025    Initial Certification  Certification Period: 4/27/2025  I certify that the therapy services are furnished while this patient is under my care.  The services outlined above are required by this patient, and will be reviewed every 90 days.     PHYSICIAN: Tunde Church PA      DATE:     Please sign and return via fax to [phone].. Thank you, [facility].

## 2025-01-29 ENCOUNTER — TREATMENT (OUTPATIENT)
Dept: PHYSICAL THERAPY | Facility: CLINIC | Age: 80
End: 2025-01-29
Payer: MEDICARE

## 2025-01-29 DIAGNOSIS — M25.562 ACUTE PAIN OF LEFT KNEE: ICD-10-CM

## 2025-01-29 DIAGNOSIS — R26.2 DIFFICULTY WALKING: ICD-10-CM

## 2025-01-29 DIAGNOSIS — Z96.652 H/O TOTAL KNEE REPLACEMENT, LEFT: Primary | ICD-10-CM

## 2025-01-29 PROCEDURE — 97112 NEUROMUSCULAR REEDUCATION: CPT | Performed by: PHYSICAL THERAPIST

## 2025-01-29 PROCEDURE — 97530 THERAPEUTIC ACTIVITIES: CPT | Performed by: PHYSICAL THERAPIST

## 2025-01-29 PROCEDURE — 97110 THERAPEUTIC EXERCISES: CPT | Performed by: PHYSICAL THERAPIST

## 2025-01-29 NOTE — PROGRESS NOTES
Physical Therapy Daily Treatment Note    [address] [place] [address]    VISIT#: 2    Subjective   Nafisa Washington reports that she did well following her initial evaluation and is doing well overall today.   Pain Rating (0/10): 5    Objective   AAROM:0-109deg  See Exercise, Manual, and Modality Logs for complete treatment.     Patient Education: Progress of therapy and POC    Assessment/Plan  Pt progressed her ROM, strengthening and activity tolerance today with rest breaks taken throughout the session to minimize fatigue and pain. Updated HEP provided at the end of the session.     Plan  Progress per Plan of Care and Progress strengthening /stabilization /functional activity            Timed:         Manual Therapy:         mins  64331;     Therapeutic Exercise:    10     mins  94503;     Neuromuscular Tracie:    15    mins  99483;    Therapeutic Activity:     13     mins  20512;     Gait Training:           mins  47671;     Ultrasound:          mins  76588;    Ionto                                   mins   93781  Self Care                            mins   81978    Un-Timed:  Electrical Stimulation:         mins  27587 ( );  Dry Needling          mins self-pay  Traction          mins 54116  Low Eval          Mins  58135  Mod Eval          Mins  38635  High Eval                            Mins  72234  Re-Eval                               mins  45139    Timed Treatment:   38   mins   Total Treatment:     38   mins    Kadi Kc PT, DPT  Physical Therapist

## 2025-02-03 ENCOUNTER — TREATMENT (OUTPATIENT)
Dept: PHYSICAL THERAPY | Facility: CLINIC | Age: 80
End: 2025-02-03
Payer: MEDICARE

## 2025-02-03 DIAGNOSIS — R26.2 DIFFICULTY WALKING: ICD-10-CM

## 2025-02-03 DIAGNOSIS — M25.562 ACUTE PAIN OF LEFT KNEE: ICD-10-CM

## 2025-02-03 DIAGNOSIS — Z96.652 H/O TOTAL KNEE REPLACEMENT, LEFT: Primary | ICD-10-CM

## 2025-02-03 PROCEDURE — 97530 THERAPEUTIC ACTIVITIES: CPT | Performed by: PHYSICAL THERAPIST

## 2025-02-03 PROCEDURE — 97110 THERAPEUTIC EXERCISES: CPT | Performed by: PHYSICAL THERAPIST

## 2025-02-03 PROCEDURE — 97112 NEUROMUSCULAR REEDUCATION: CPT | Performed by: PHYSICAL THERAPIST

## 2025-02-03 NOTE — PROGRESS NOTES
Physical Therapy Daily Treatment Note    [address] [place] [address]    VISIT#: 3    Subjective   Nafisa Washington reports that she is stiff today following increased standing yesterday  Pain Rating (0/10): 2    Objective   AAROM:116deg of flexion  See Exercise, Manual, and Modality Logs for complete treatment.     Patient Education: Progress of therapy and POC    Assessment/Plan  Pt continues to progress strengthening, stability, ROM, proprioception and activity tolerance with greater amounts of standing exercises performed today    Plan  Progress per Plan of Care and Progress strengthening /stabilization /functional activity            Timed:         Manual Therapy:     5    mins  86052;     Therapeutic Exercise:    10     mins  61783;     Neuromuscular Tracie:    15    mins  61829;    Therapeutic Activity:     10     mins  75778;     Gait Training:           mins  86456;     Ultrasound:          mins  69790;    Ionto                                   mins   89070  Self Care                           mins   53749    Un-Timed:  Electrical Stimulation:         mins  96900 ( );  Dry Needling          mins self-pay  Traction          mins 90731  Low Eval          Mins  68445  Mod Eval          Mins  47566  High Eval                            Mins  46867  Re-Eval                               mins  85860    Timed Treatment:   40   mins   Total Treatment:     40   mins    Kadi Kc PT, DPT  Physical Therapist

## 2025-02-05 ENCOUNTER — TREATMENT (OUTPATIENT)
Dept: PHYSICAL THERAPY | Facility: CLINIC | Age: 80
End: 2025-02-05
Payer: MEDICARE

## 2025-02-05 DIAGNOSIS — Z96.652 H/O TOTAL KNEE REPLACEMENT, LEFT: Primary | ICD-10-CM

## 2025-02-05 DIAGNOSIS — M25.562 ACUTE PAIN OF LEFT KNEE: ICD-10-CM

## 2025-02-05 DIAGNOSIS — R26.2 DIFFICULTY WALKING: ICD-10-CM

## 2025-02-05 PROCEDURE — 97112 NEUROMUSCULAR REEDUCATION: CPT | Performed by: PHYSICAL THERAPIST

## 2025-02-05 PROCEDURE — 97110 THERAPEUTIC EXERCISES: CPT | Performed by: PHYSICAL THERAPIST

## 2025-02-05 PROCEDURE — 97116 GAIT TRAINING THERAPY: CPT | Performed by: PHYSICAL THERAPIST

## 2025-02-05 NOTE — PROGRESS NOTES
Physical Therapy Daily Treatment Note    [address] [place] [address]    VISIT#: 4    Subjective   Nafisa Washington reports that she stopped using her cane yesterday and feels that she is getting stronger by the day  Pain Rating (0/10): 10    Objective   AAROM:119deg flexion  See Exercise, Manual, and Modality Logs for complete treatment.     Patient Education: Progress of therapy and POC    Assessment/Plan  Pt ambulated into the clinic with no AD today and progressed in strengthening, stability, ROM, proprioception and activity tolerance with minimal to no increase in pain as the session progressed.     Plan  Progress per Plan of Care and Progress strengthening /stabilization /functional activity            Timed:         Manual Therapy:         mins  16813;     Therapeutic Exercise:    10     mins  67224;     Neuromuscular Tracie:    10    mins  67957;    Therapeutic Activity:     8     mins  72435;     Gait Training:      10     mins  65204;     Ultrasound:          mins  92849;    Ionto                                   mins   64623  Self Care                            mins   11160    Un-Timed:  Electrical Stimulation:         mins  09657 ( );  Dry Needling          mins self-pay  Traction          mins 95389  Low Eval          Mins  64505  Mod Eval          Mins  61323  High Eval                            Mins  30793  Re-Eval                               mins  00603    Timed Treatment:   38   mins   Total Treatment:     38   mins    Kadi Kc PT, DPT  Physical Therapist

## 2025-02-06 DIAGNOSIS — I10 ESSENTIAL HYPERTENSION: ICD-10-CM

## 2025-02-06 RX ORDER — AMLODIPINE BESYLATE 10 MG/1
10 TABLET ORAL DAILY
Qty: 90 TABLET | Refills: 1 | Status: SHIPPED | OUTPATIENT
Start: 2025-02-06

## 2025-02-10 ENCOUNTER — TREATMENT (OUTPATIENT)
Dept: PHYSICAL THERAPY | Facility: CLINIC | Age: 80
End: 2025-02-10
Payer: MEDICARE

## 2025-02-10 DIAGNOSIS — M25.562 ACUTE PAIN OF LEFT KNEE: ICD-10-CM

## 2025-02-10 DIAGNOSIS — Z96.652 H/O TOTAL KNEE REPLACEMENT, LEFT: Primary | ICD-10-CM

## 2025-02-10 DIAGNOSIS — R26.2 DIFFICULTY WALKING: ICD-10-CM

## 2025-02-10 PROCEDURE — 97110 THERAPEUTIC EXERCISES: CPT | Performed by: PHYSICAL THERAPIST

## 2025-02-10 PROCEDURE — 97112 NEUROMUSCULAR REEDUCATION: CPT | Performed by: PHYSICAL THERAPIST

## 2025-02-10 PROCEDURE — 97116 GAIT TRAINING THERAPY: CPT | Performed by: PHYSICAL THERAPIST

## 2025-02-10 NOTE — PROGRESS NOTES
Physical Therapy Daily Treatment Note    [address] [place] [address]    VISIT#: 5    Subjective   Nafisa Washington reports that she is stiff today and relates increased stiffness to colder weather but overall is doing well   Pain Rating (0/10): 5    Objective   AAROM:120deg flexion  See Exercise, Manual, and Modality Logs for complete treatment.     Patient Education: Progress of therapy and POC    Assessment/Plan  Pt continues to progress strengthening, stability, ROM, proprioception and activity tolerance with greater amounts of standing exercises performed today as compared to previous sessions with minimal to no increase in pain as the session progressed    Plan  Progress per Plan of Care and Progress strengthening /stabilization /functional activity            Timed:         Manual Therapy:         mins  71365;     Therapeutic Exercise:    10     mins  04755;     Neuromuscular Tracie:    10    mins  74170;    Therapeutic Activity:     10     mins  79835;     Gait Training:      10     mins  35765;     Ultrasound:          mins  60921;    Ionto                                   mins   83226  Self Care                            mins   26353    Un-Timed:  Electrical Stimulation:         mins  12550 ( );  Dry Needling          mins self-pay  Traction          mins 33266  Low Eval          Mins  71267  Mod Eval          Mins  66386  High Eval                            Mins  81792  Re-Eval                               mins  61777    Timed Treatment:   40   mins   Total Treatment:     40   mins    Kadi Kc PT, DPT  Physical Therapist

## 2025-02-12 DIAGNOSIS — E03.9 HYPOTHYROIDISM, UNSPECIFIED TYPE: ICD-10-CM

## 2025-02-12 RX ORDER — SULFASALAZINE 500 MG/1
500 TABLET ORAL 2 TIMES DAILY
Qty: 180 TABLET | Refills: 1 | Status: SHIPPED | OUTPATIENT
Start: 2025-02-12

## 2025-02-12 RX ORDER — LEVOTHYROXINE SODIUM 100 UG/1
100 TABLET ORAL DAILY
Qty: 90 TABLET | Refills: 1 | Status: SHIPPED | OUTPATIENT
Start: 2025-02-12

## 2025-02-13 ENCOUNTER — TELEPHONE (OUTPATIENT)
Dept: PHYSICAL THERAPY | Facility: CLINIC | Age: 80
End: 2025-02-13

## 2025-02-13 NOTE — TELEPHONE ENCOUNTER
Caller: Nafisa Washington    Relationship: Self       What was the call regarding: WOKE UP WITH FEAVER AND A COUGH

## 2025-03-14 ENCOUNTER — TELEPHONE (OUTPATIENT)
Dept: FAMILY MEDICINE CLINIC | Facility: CLINIC | Age: 80
End: 2025-03-14
Payer: MEDICARE

## 2025-03-14 DIAGNOSIS — Z12.31 BREAST CANCER SCREENING BY MAMMOGRAM: Primary | ICD-10-CM

## 2025-03-14 NOTE — TELEPHONE ENCOUNTER
Caller: Nafisa Washington    Relationship: Self    Best call back number: [phone]     What orders are you requesting (i.e. lab or imaging): STANDING ORDER FOR MAMMOGRAM    In what timeframe would the patient need to come in: BEFORE 4/10/25    Where will you receive your lab/imaging services: [facility]    Additional notes:

## 2025-04-04 DIAGNOSIS — E78.2 MIXED HYPERLIPIDEMIA: ICD-10-CM

## 2025-04-04 DIAGNOSIS — E03.9 HYPOTHYROIDISM, UNSPECIFIED TYPE: ICD-10-CM

## 2025-04-04 DIAGNOSIS — I10 ESSENTIAL HYPERTENSION: Primary | ICD-10-CM

## 2025-04-07 ENCOUNTER — LAB (OUTPATIENT)
Dept: FAMILY MEDICINE CLINIC | Facility: CLINIC | Age: 80
End: 2025-04-07
Payer: MEDICARE

## 2025-04-07 PROCEDURE — 36415 COLL VENOUS BLD VENIPUNCTURE: CPT | Performed by: NURSE PRACTITIONER

## 2025-04-07 PROCEDURE — 84443 ASSAY THYROID STIM HORMONE: CPT | Performed by: NURSE PRACTITIONER

## 2025-04-07 PROCEDURE — 80053 COMPREHEN METABOLIC PANEL: CPT | Performed by: NURSE PRACTITIONER

## 2025-04-07 PROCEDURE — 85027 COMPLETE CBC AUTOMATED: CPT | Performed by: NURSE PRACTITIONER

## 2025-04-07 PROCEDURE — 80061 LIPID PANEL: CPT | Performed by: NURSE PRACTITIONER

## 2025-04-08 LAB
ALBUMIN SERPL-MCNC: 4.2 G/DL (ref 3.5–5.2)
ALBUMIN/GLOB SERPL: 1.6 G/DL
ALP SERPL-CCNC: 81 U/L (ref 39–117)
ALT SERPL W P-5'-P-CCNC: 14 U/L (ref 1–33)
ANION GAP SERPL CALCULATED.3IONS-SCNC: 12.4 MMOL/L (ref 5–15)
AST SERPL-CCNC: 25 U/L (ref 1–32)
BILIRUB SERPL-MCNC: 0.4 MG/DL (ref 0–1.2)
BUN SERPL-MCNC: 6 MG/DL (ref 8–23)
BUN/CREAT SERPL: 11.8 (ref 7–25)
CALCIUM SPEC-SCNC: 9.4 MG/DL (ref 8.6–10.5)
CHLORIDE SERPL-SCNC: 90 MMOL/L (ref 98–107)
CHOLEST SERPL-MCNC: 182 MG/DL (ref 0–200)
CO2 SERPL-SCNC: 24.6 MMOL/L (ref 22–29)
CREAT SERPL-MCNC: 0.51 MG/DL (ref 0.57–1)
DEPRECATED RDW RBC AUTO: 42.1 FL (ref 37–54)
EGFRCR SERPLBLD CKD-EPI 2021: 95.7 ML/MIN/1.73
ERYTHROCYTE [DISTWIDTH] IN BLOOD BY AUTOMATED COUNT: 12.8 % (ref 12.3–15.4)
GLOBULIN UR ELPH-MCNC: 2.7 GM/DL
GLUCOSE SERPL-MCNC: 117 MG/DL (ref 65–99)
HCT VFR BLD AUTO: 38.8 % (ref 34–46.6)
HDLC SERPL-MCNC: 76 MG/DL (ref 40–60)
HGB BLD-MCNC: 12.4 G/DL (ref 12–15.9)
LDLC SERPL CALC-MCNC: 95 MG/DL (ref 0–100)
LDLC/HDLC SERPL: 1.24 {RATIO}
MCH RBC QN AUTO: 29.3 PG (ref 26.6–33)
MCHC RBC AUTO-ENTMCNC: 32 G/DL (ref 31.5–35.7)
MCV RBC AUTO: 91.7 FL (ref 79–97)
PLATELET # BLD AUTO: 256 10*3/MM3 (ref 140–450)
PMV BLD AUTO: 9.9 FL (ref 6–12)
POTASSIUM SERPL-SCNC: 4.1 MMOL/L (ref 3.5–5.2)
PROT SERPL-MCNC: 6.9 G/DL (ref 6–8.5)
RBC # BLD AUTO: 4.23 10*6/MM3 (ref 3.77–5.28)
SODIUM SERPL-SCNC: 127 MMOL/L (ref 136–145)
TRIGL SERPL-MCNC: 58 MG/DL (ref 0–150)
TSH SERPL DL<=0.05 MIU/L-ACNC: 1.65 UIU/ML (ref 0.27–4.2)
VLDLC SERPL-MCNC: 11 MG/DL (ref 5–40)
WBC NRBC COR # BLD AUTO: 5.77 10*3/MM3 (ref 3.4–10.8)

## 2025-04-14 ENCOUNTER — OFFICE VISIT (OUTPATIENT)
Dept: FAMILY MEDICINE CLINIC | Facility: CLINIC | Age: 80
End: 2025-04-14
Payer: MEDICARE

## 2025-04-14 VITALS
OXYGEN SATURATION: 99 % | HEIGHT: 67 IN | TEMPERATURE: 97.8 F | HEART RATE: 62 BPM | BODY MASS INDEX: 27.34 KG/M2 | DIASTOLIC BLOOD PRESSURE: 71 MMHG | WEIGHT: 174.2 LBS | SYSTOLIC BLOOD PRESSURE: 145 MMHG

## 2025-04-14 DIAGNOSIS — M15.4 EROSIVE OSTEOARTHRITIS OF MULTIPLE SITES: ICD-10-CM

## 2025-04-14 DIAGNOSIS — N39.46 MIXED STRESS AND URGE URINARY INCONTINENCE: ICD-10-CM

## 2025-04-14 DIAGNOSIS — Z96.652 STATUS POST TOTAL KNEE REPLACEMENT, LEFT: Primary | ICD-10-CM

## 2025-04-14 DIAGNOSIS — R73.09 ABNORMAL GLUCOSE: ICD-10-CM

## 2025-04-14 DIAGNOSIS — E87.1 CHRONIC HYPONATREMIA: ICD-10-CM

## 2025-04-14 DIAGNOSIS — E78.2 MIXED HYPERLIPIDEMIA: ICD-10-CM

## 2025-04-14 DIAGNOSIS — R42 DIZZINESS: ICD-10-CM

## 2025-04-14 DIAGNOSIS — E89.0 POSTABLATIVE HYPOTHYROIDISM: ICD-10-CM

## 2025-04-14 DIAGNOSIS — J45.40 MODERATE PERSISTENT ASTHMA WITHOUT COMPLICATION: ICD-10-CM

## 2025-04-14 DIAGNOSIS — I10 ESSENTIAL HYPERTENSION: ICD-10-CM

## 2025-04-14 PROCEDURE — 3078F DIAST BP <80 MM HG: CPT | Performed by: NURSE PRACTITIONER

## 2025-04-14 PROCEDURE — 3077F SYST BP >= 140 MM HG: CPT | Performed by: NURSE PRACTITIONER

## 2025-04-14 PROCEDURE — 1160F RVW MEDS BY RX/DR IN RCRD: CPT | Performed by: NURSE PRACTITIONER

## 2025-04-14 PROCEDURE — 93000 ELECTROCARDIOGRAM COMPLETE: CPT | Performed by: NURSE PRACTITIONER

## 2025-04-14 PROCEDURE — 1126F AMNT PAIN NOTED NONE PRSNT: CPT | Performed by: NURSE PRACTITIONER

## 2025-04-14 PROCEDURE — 99214 OFFICE O/P EST MOD 30 MIN: CPT | Performed by: NURSE PRACTITIONER

## 2025-04-14 PROCEDURE — 1159F MED LIST DOCD IN RCRD: CPT | Performed by: NURSE PRACTITIONER

## 2025-04-14 RX ORDER — SODIUM CHLORIDE 1 G/1
1 TABLET ORAL DAILY
Qty: 90 TABLET | Refills: 1 | Status: SHIPPED | OUTPATIENT
Start: 2025-04-14

## 2025-04-14 NOTE — PROGRESS NOTES
Chief Complaint  Chief Complaint   Patient presents with    Follow-up     6 month  Labs 4/7/25  Pt had left knee replacement 1/6/25- doing very well. Planning on having right knee replacement in August 2025    Dizziness     Pt states has 2 sisters just diagnosed with a-fib. Notes she has started having positional dizziness while gardening           Subjective          Nafisa Washington presents to [facility] for   History of Present Illness    Patient underwent left total knee replacement on 1/6/2025 per Dr. Shankar.  She is doing very well, completed physical therapy and doing home exercises.  Planning right total knee replacement in August. She is walking/exercising and doing very well.  She has lost 6 pounds since last visit.    Hypothyroidism, with history of thyroid ablation.  TSH has been stable, patient on levothyroxine 100. She has a sister with history of thyroid cancer, pt had normal thyroid ultrasound 2022.  Denies symptoms of constipation, hot or cold intolerance, hair loss, abnormal heart rate and fatigue.      Chronic hyponatremia, 127-133. She complains of dizziness, worsening.     HTN, stable on meds and takes as directed, she reports positional dizziness with bending or sit/stand. Denies chest pain, headache, shortness of air, palpitations and swelling of extremities.      Hyperlipidemia, changed diet, she is eating more fish/chicken, salads. The patient denies muscle aches, constipation, diarrhea, GI upset, fatigue, chest pain/pressure, exercise intolerance, dyspnea, palpitations, syncope and pedal edema.       Family history of breast cancer, she completes mammograms every year, up-to-date and normal in February     Erosive osteoarthritis of multiple joints, osteoporosis now osteopenia, follows with rheumatology on sulfasalazine and Reclast.    -DEXA up to date      Asthma, allergy, using Advair diskus daily and albuterol if needed.      Urinary urge/stress  incontinence, improved on oxybutynin     Colonoscopy up-to-date September 2023, polyps removed, recommended recall 3 years     Macular degeneration, seeing Dr. Isaac in [place].       Here to review labs      The following portions of the patient's history were reviewed and updated as appropriate: allergies, current medications, past family history, past medical history, past social history, past surgical history and problem list.    Past Medical History:   Diagnosis Date    Allergic     Asthma     Disease of thyroid gland     Hyperlipidemia     Hypertension     Knee swelling 5 years ago    Macular degeneration      Past Surgical History:   Procedure Laterality Date    ADENOIDECTOMY      DENTAL PROCEDURE      HYSTERECTOMY      Full     REPLACEMENT TOTAL KNEE Left 01/06/2025    TONSILLECTOMY      TRIGGER POINT INJECTION  5 years ago    bad knees- gel injections     Family History   Problem Relation Age of Onset    Atrial fibrillation Sister      Social History     Tobacco Use    Smoking status: Never     Passive exposure: Never    Smokeless tobacco: Never   Substance Use Topics    Alcohol use: Never       Current Outpatient Medications:     amLODIPine (NORVASC) 10 MG tablet, TAKE 1 TABLET BY MOUTH DAILY, Disp: 90 tablet, Rfl: 1    atorvastatin (LIPITOR) 10 MG tablet, Take 1 tablet by mouth Daily., Disp: 90 tablet, Rfl: 1    cholecalciferol (VITAMIN D3) 1000 units tablet, Take 1 tablet by mouth Daily., Disp: , Rfl:     Ferrous Sulfate Dried  (50 Fe) MG tablet controlled-release, Take 160 mg by mouth Daily., Disp: 90 each, Rfl: 1    Fluticasone-Salmeterol (Wixela Inhub) 100-50 MCG/ACT DISKUS, INHALE 1 PUFF BY MOUTH TWICE DAILY, Disp: 60 each, Rfl: 3    fosinopril (MONOPRIL) 20 MG tablet, Take 1 tablet by mouth 2 (Two) Times a Day., Disp: 180 tablet, Rfl: 1    hydroCHLOROthiazide 25 MG tablet, Take 1 tablet by mouth Daily., Disp: 90 tablet, Rfl: 1    levothyroxine (SYNTHROID, LEVOTHROID) 100 MCG tablet, Take  "1 tablet by mouth Daily., Disp: 90 tablet, Rfl: 1    Multiple Vitamin (MULTI-VITAMIN DAILY PO), Take  by mouth., Disp: , Rfl:     multivitamins-minerals (PRESERVISION AREDS 2) capsule capsule, Take 1 capsule by mouth 2 (Two) Times a Day., Disp: , Rfl:     oxybutynin (DITROPAN) 5 MG tablet, Take 1 tablet by mouth Daily., Disp: 90 tablet, Rfl: 1    potassium chloride 10 MEQ CR tablet, Take 1 tablet by mouth Daily., Disp: 90 tablet, Rfl: 1    sulfaSALAzine (AZULFIDINE) 500 MG tablet, Take 1 tablet by mouth 2 (Two) Times a Day., Disp: 180 tablet, Rfl: 1    Ventolin  (90 Base) MCG/ACT inhaler, Inhale 2 puffs Every 4 (Four) Hours As Needed for Wheezing., Disp: 18 g, Rfl: 5    zoledronic acid (RECLAST) 5 MG/100ML solution infusion, Infuse 100 mL into a venous catheter 1 (One) Time., Disp: , Rfl:     sodium chloride 1 g tablet, Take 1 tablet by mouth Daily., Disp: 90 tablet, Rfl: 1    Objective   Vital Signs:   Vitals:    04/14/25 0939   BP: 145/71   BP Location: Left arm   Patient Position: Sitting   Cuff Size: Adult   Pulse: 62   Temp: 97.8 °F (36.6 °C)   TempSrc: Oral   SpO2: 99%   Weight: 79 kg (174 lb 3.2 oz)   Height: 170.2 cm (67\")   PainSc: 0-No pain       Body mass index is 27.28 kg/m².    Physical Exam  Constitutional:       General: She is not in acute distress.     Appearance: Normal appearance. She is well-developed. She is not ill-appearing or diaphoretic.   HENT:      Head: Normocephalic.      Nose: Rhinorrhea present.   Eyes:      Conjunctiva/sclera: Conjunctivae normal.      Pupils: Pupils are equal, round, and reactive to light.   Neck:      Thyroid: No thyromegaly.      Vascular: No JVD.   Cardiovascular:      Rate and Rhythm: Normal rate and regular rhythm.      Heart sounds: Normal heart sounds. No murmur heard.  Pulmonary:      Effort: Pulmonary effort is normal. No respiratory distress.      Breath sounds: Normal breath sounds. No wheezing or rhonchi.   Abdominal:      General: Bowel sounds are " normal. There is no distension.      Palpations: Abdomen is soft.      Tenderness: There is no abdominal tenderness.   Musculoskeletal:         General: Tenderness (R knee OA, mod alvarez rom. L TKR normal rom, no pain) present. No swelling. Normal range of motion.      Cervical back: Normal range of motion and neck supple. No tenderness.   Lymphadenopathy:      Cervical: No cervical adenopathy.   Skin:     General: Skin is warm and dry.      Coloration: Skin is not jaundiced.      Findings: No erythema or rash.   Neurological:      General: No focal deficit present.      Mental Status: She is alert and oriented to person, place, and time. Mental status is at baseline.      Sensory: No sensory deficit.      Motor: No weakness.      Gait: Gait normal.   Psychiatric:         Mood and Affect: Mood normal.         Behavior: Behavior normal.         Thought Content: Thought content normal.         Judgment: Judgment normal.          Result Review :     No visits with results within 7 Day(s) from this visit.   Latest known visit with results is:   Orders Only on 04/04/2025   Component Date Value Ref Range Status    Total Cholesterol 04/07/2025 182  0 - 200 mg/dL Final    Triglycerides 04/07/2025 58  0 - 150 mg/dL Final    HDL Cholesterol 04/07/2025 76 (H)  40 - 60 mg/dL Final    LDL Cholesterol  04/07/2025 95  0 - 100 mg/dL Final    VLDL Cholesterol 04/07/2025 11  5 - 40 mg/dL Final    LDL/HDL Ratio 04/07/2025 1.24   Final    Glucose 04/07/2025 117 (H)  65 - 99 mg/dL Final    BUN 04/07/2025 6 (L)  8 - 23 mg/dL Final    Creatinine 04/07/2025 0.51 (L)  0.57 - 1.00 mg/dL Final    Sodium 04/07/2025 127 (L)  136 - 145 mmol/L Final    Potassium 04/07/2025 4.1  3.5 - 5.2 mmol/L Final    Chloride 04/07/2025 90 (L)  98 - 107 mmol/L Final    CO2 04/07/2025 24.6  22.0 - 29.0 mmol/L Final    Calcium 04/07/2025 9.4  8.6 - 10.5 mg/dL Final    Total Protein 04/07/2025 6.9  6.0 - 8.5 g/dL Final    Albumin 04/07/2025 4.2  3.5 - 5.2 g/dL Final     ALT (SGPT) 04/07/2025 14  1 - 33 U/L Final    AST (SGOT) 04/07/2025 25  1 - 32 U/L Final    Alkaline Phosphatase 04/07/2025 81  39 - 117 U/L Final    Total Bilirubin 04/07/2025 0.4  0.0 - 1.2 mg/dL Final    Globulin 04/07/2025 2.7  gm/dL Final    A/G Ratio 04/07/2025 1.6  g/dL Final    BUN/Creatinine Ratio 04/07/2025 11.8  7.0 - 25.0 Final    Anion Gap 04/07/2025 12.4  5.0 - 15.0 mmol/L Final    eGFR 04/07/2025 95.7  >60.0 mL/min/1.73 Final    WBC 04/07/2025 5.77  3.40 - 10.80 10*3/mm3 Final    RBC 04/07/2025 4.23  3.77 - 5.28 10*6/mm3 Final    Hemoglobin 04/07/2025 12.4  12.0 - 15.9 g/dL Final    Hematocrit 04/07/2025 38.8  34.0 - 46.6 % Final    MCV 04/07/2025 91.7  79.0 - 97.0 fL Final    MCH 04/07/2025 29.3  26.6 - 33.0 pg Final    MCHC 04/07/2025 32.0  31.5 - 35.7 g/dL Final    RDW 04/07/2025 12.8  12.3 - 15.4 % Final    RDW-SD 04/07/2025 42.1  37.0 - 54.0 fl Final    MPV 04/07/2025 9.9  6.0 - 12.0 fL Final    Platelets 04/07/2025 256  140 - 450 10*3/mm3 Final    TSH 04/07/2025 1.650  0.270 - 4.200 uIU/mL Final                  BMI is >= 25 and <30. (Overweight) The following options were offered after discussion;: exercise counseling/recommendations and nutrition counseling/recommendations           Assessment and Plan    Diagnoses and all orders for this visit:    1. Status post total knee replacement, left (Primary)    2. Abnormal glucose    3. Chronic hyponatremia  Comments:  add sodium chloride  Orders:  -     sodium chloride 1 g tablet; Take 1 tablet by mouth Daily.  Dispense: 90 tablet; Refill: 1  -     ECG 12 Lead    4. Dizziness  -     ECG 12 Lead    5. Essential hypertension    6. Mixed hyperlipidemia    7. Mixed stress and urge urinary incontinence    8. Moderate persistent asthma without complication    9. Erosive osteoarthritis of multiple sites    10. Postablative hypothyroidism      Overall doing well  Cont current med regimen, refills when needed   Dizziness likely 2/2 low Na, EKG today-NSR,  consider holter  Labs reviewed with patient    EKG: normal EKG, normal sinus rhythm.         I spent 30 minutes caring for Nafisa Washington on this date of service. This time includes time spent by me in the following activities: preparing for the visit, reviewing tests, performing a medically appropriate examination and/or evaluation , counseling and educating the patient/family/caregiver, ordering medications, tests, or procedures and documenting information in the medical record        Follow Up     Return in about 6 months (around 10/14/2025) for Recheck, Medicare Wellness. HTN panel and [id number] prior to appt .  Patient was given instructions and counseling regarding her condition or for health maintenance advice. Please see specific information pulled into the AVS if appropriate.        Part of this note may be an electronic transcription/translation of spoken language to printed text using the Dragon Dictation System

## 2025-04-17 RX ORDER — FLUTICASONE PROPIONATE AND SALMETEROL 100; 50 UG/1; UG/1
1 POWDER RESPIRATORY (INHALATION) 2 TIMES DAILY
Qty: 60 EACH | Refills: 3 | Status: SHIPPED | OUTPATIENT
Start: 2025-04-17

## 2025-07-14 RX ORDER — POTASSIUM CHLORIDE 750 MG/1
10 TABLET, EXTENDED RELEASE ORAL DAILY
Qty: 90 TABLET | Refills: 1 | Status: SHIPPED | OUTPATIENT
Start: 2025-07-14

## 2025-07-15 DIAGNOSIS — N39.46 MIXED STRESS AND URGE URINARY INCONTINENCE: ICD-10-CM

## 2025-07-15 RX ORDER — OXYBUTYNIN CHLORIDE 5 MG/1
5 TABLET ORAL DAILY
Qty: 90 TABLET | Refills: 1 | Status: SHIPPED | OUTPATIENT
Start: 2025-07-15

## 2025-07-16 ENCOUNTER — DOCUMENTATION (OUTPATIENT)
Dept: PHYSICAL THERAPY | Facility: CLINIC | Age: 80
End: 2025-07-16
Payer: MEDICARE

## 2025-07-16 NOTE — PROGRESS NOTES
Discharge Summary  Discharge Summary from Physical Therapy Report      Date of Initial PT visit: 1/27/25  Number of Visits Seen: 5     Discharge Status of Patient:   SHORT TERM GOALS: Time for Goal Achievement: 4 weeks    1.  Patient to be compliant with HEP. -MET  2.  Pt able to ascend/descend steps and transfer with less knee pain < 5/10-MET  3.  Increased hip joint mobility to allow for decreased stress at tibiofemoral joint-MET  4.  Pt. to exhibit increased LE endurance/strength to 4+/5 to allow for increased ease with sit-stand and standing/walking > 30 minutes, such as walking in grocery-MET    LONG TERM GOALS: Time for Goal Achievement: 8 weeks  1.  Pt to score < 15% perceived disability on  LEFS-NOT MET  2.  Patient able to ascend/descend steps and prolonged standing & cooking with pain < 2/10-NOT MET  3.  Pt to exhibit full knee AROM to allow for kneeling, bending squatting as is necessary for ADL's, IADL's and household activities. -NOT MET  4.  Pt to exhibit LE endurance/strength to 5/5 to allow for walking, steps and transfers to occur safely-NOT MET  5.  Pt to demonstrate increased stability of the knee to balance on foam as needed to traverse uneven terrain .  -NOT MET    Goals: Partially Met    Discharge Plan: Continue with current home exercise program as instructed  Future need for rehabilitation activities        Date of Discharge 7/16/25        Kadi Kc, PT, DPT  Physical Therapist

## 2025-07-19 DIAGNOSIS — E03.9 HYPOTHYROIDISM, UNSPECIFIED TYPE: ICD-10-CM

## 2025-07-19 DIAGNOSIS — I10 ESSENTIAL HYPERTENSION: ICD-10-CM

## 2025-07-19 DIAGNOSIS — E78.2 MIXED HYPERLIPIDEMIA: ICD-10-CM

## 2025-07-21 RX ORDER — AMLODIPINE BESYLATE 10 MG/1
10 TABLET ORAL DAILY
Qty: 90 TABLET | Refills: 1 | Status: SHIPPED | OUTPATIENT
Start: 2025-07-21

## 2025-07-21 RX ORDER — LEVOTHYROXINE SODIUM 100 UG/1
100 TABLET ORAL DAILY
Qty: 90 TABLET | Refills: 1 | Status: SHIPPED | OUTPATIENT
Start: 2025-07-21

## 2025-07-21 RX ORDER — ATORVASTATIN CALCIUM 10 MG/1
10 TABLET, FILM COATED ORAL DAILY
Qty: 90 TABLET | Refills: 1 | Status: SHIPPED | OUTPATIENT
Start: 2025-07-21

## 2025-07-21 RX ORDER — HYDROCHLOROTHIAZIDE 25 MG/1
25 TABLET ORAL DAILY
Qty: 90 TABLET | Refills: 1 | Status: SHIPPED | OUTPATIENT
Start: 2025-07-21

## 2025-07-21 RX ORDER — FOSINOPRIL SODIUM 20 MG/1
20 TABLET ORAL 2 TIMES DAILY
Qty: 180 TABLET | Refills: 1 | Status: SHIPPED | OUTPATIENT
Start: 2025-07-21

## 2025-08-14 RX ORDER — FLUTICASONE PROPIONATE AND SALMETEROL 100; 50 UG/1; UG/1
1 POWDER RESPIRATORY (INHALATION) 2 TIMES DAILY
Qty: 60 EACH | Refills: 3 | Status: SHIPPED | OUTPATIENT
Start: 2025-08-14